# Patient Record
Sex: FEMALE | Race: WHITE | NOT HISPANIC OR LATINO | Employment: FULL TIME | URBAN - METROPOLITAN AREA
[De-identification: names, ages, dates, MRNs, and addresses within clinical notes are randomized per-mention and may not be internally consistent; named-entity substitution may affect disease eponyms.]

---

## 2017-01-11 ENCOUNTER — ANESTHESIA - HEALTHEAST (OUTPATIENT)
Dept: SURGERY | Facility: CLINIC | Age: 47
End: 2017-01-11

## 2017-01-11 ENCOUNTER — OFFICE VISIT - HEALTHEAST (OUTPATIENT)
Dept: FAMILY MEDICINE | Facility: CLINIC | Age: 47
End: 2017-01-11

## 2017-01-11 ENCOUNTER — COMMUNICATION - HEALTHEAST (OUTPATIENT)
Dept: FAMILY MEDICINE | Facility: CLINIC | Age: 47
End: 2017-01-11

## 2017-01-11 ENCOUNTER — HOSPITAL ENCOUNTER (OUTPATIENT)
Dept: CT IMAGING | Facility: CLINIC | Age: 47
Discharge: HOME | End: 2017-01-11
Attending: FAMILY MEDICINE

## 2017-01-11 ENCOUNTER — SURGERY - HEALTHEAST (OUTPATIENT)
Dept: SURGERY | Facility: CLINIC | Age: 47
End: 2017-01-11

## 2017-01-11 DIAGNOSIS — R10.9 ABDOMINAL PAIN: ICD-10-CM

## 2017-01-11 ASSESSMENT — MIFFLIN-ST. JEOR: SCORE: 1369.24

## 2017-01-12 LAB
GLIADIN IGA SER-ACNC: 1.6 U/ML
GLIADIN IGG SER-ACNC: 1.2 U/ML
IGA SERPL-MCNC: 226 MG/DL (ref 65–400)
TTG IGA SER-ACNC: 0.4 U/ML
TTG IGG SER-ACNC: 0.7 U/ML

## 2017-01-12 ASSESSMENT — MIFFLIN-ST. JEOR: SCORE: 1352.51

## 2017-01-24 ENCOUNTER — OFFICE VISIT - HEALTHEAST (OUTPATIENT)
Dept: SURGERY | Facility: CLINIC | Age: 47
End: 2017-01-24

## 2017-01-24 DIAGNOSIS — Z98.890 POST-OPERATIVE STATE: ICD-10-CM

## 2017-01-30 ENCOUNTER — COMMUNICATION - HEALTHEAST (OUTPATIENT)
Dept: FAMILY MEDICINE | Facility: CLINIC | Age: 47
End: 2017-01-30

## 2017-03-28 ENCOUNTER — OFFICE VISIT - HEALTHEAST (OUTPATIENT)
Dept: FAMILY MEDICINE | Facility: CLINIC | Age: 47
End: 2017-03-28

## 2017-03-28 DIAGNOSIS — Z91.09 ENVIRONMENTAL ALLERGIES: ICD-10-CM

## 2017-03-28 DIAGNOSIS — I10 ESSENTIAL HYPERTENSION: ICD-10-CM

## 2017-03-28 DIAGNOSIS — J45.909 ASTHMA: ICD-10-CM

## 2017-03-28 DIAGNOSIS — M77.10: ICD-10-CM

## 2017-04-20 ENCOUNTER — COMMUNICATION - HEALTHEAST (OUTPATIENT)
Dept: FAMILY MEDICINE | Facility: CLINIC | Age: 47
End: 2017-04-20

## 2017-04-20 DIAGNOSIS — I10 UNSPECIFIED ESSENTIAL HYPERTENSION: ICD-10-CM

## 2017-08-17 ENCOUNTER — COMMUNICATION - HEALTHEAST (OUTPATIENT)
Dept: FAMILY MEDICINE | Facility: CLINIC | Age: 47
End: 2017-08-17

## 2017-08-17 DIAGNOSIS — J45.20 ASTHMA IN ADULT, MILD INTERMITTENT, UNCOMPLICATED: ICD-10-CM

## 2017-08-31 ENCOUNTER — COMMUNICATION - HEALTHEAST (OUTPATIENT)
Dept: FAMILY MEDICINE | Facility: CLINIC | Age: 47
End: 2017-08-31

## 2017-08-31 DIAGNOSIS — J45.909 ASTHMA: ICD-10-CM

## 2017-11-09 ENCOUNTER — AMBULATORY - HEALTHEAST (OUTPATIENT)
Dept: NURSING | Facility: CLINIC | Age: 47
End: 2017-11-09

## 2017-11-09 DIAGNOSIS — Z23 NEED FOR INFLUENZA VACCINATION: ICD-10-CM

## 2017-12-11 ENCOUNTER — COMMUNICATION - HEALTHEAST (OUTPATIENT)
Dept: FAMILY MEDICINE | Facility: CLINIC | Age: 47
End: 2017-12-11

## 2017-12-11 DIAGNOSIS — J45.20 ASTHMA IN ADULT, MILD INTERMITTENT, UNCOMPLICATED: ICD-10-CM

## 2017-12-27 ENCOUNTER — COMMUNICATION - HEALTHEAST (OUTPATIENT)
Dept: FAMILY MEDICINE | Facility: CLINIC | Age: 47
End: 2017-12-27

## 2017-12-27 DIAGNOSIS — J45.20 MILD INTERMITTENT ASTHMA WITHOUT COMPLICATION: ICD-10-CM

## 2018-01-21 ENCOUNTER — COMMUNICATION - HEALTHEAST (OUTPATIENT)
Dept: FAMILY MEDICINE | Facility: CLINIC | Age: 48
End: 2018-01-21

## 2018-01-21 DIAGNOSIS — J45.20 MILD INTERMITTENT ASTHMA WITHOUT COMPLICATION: ICD-10-CM

## 2018-04-26 ENCOUNTER — COMMUNICATION - HEALTHEAST (OUTPATIENT)
Dept: FAMILY MEDICINE | Facility: CLINIC | Age: 48
End: 2018-04-26

## 2018-04-26 DIAGNOSIS — I10 ESSENTIAL HYPERTENSION: ICD-10-CM

## 2018-05-15 ENCOUNTER — AMBULATORY - HEALTHEAST (OUTPATIENT)
Dept: FAMILY MEDICINE | Facility: CLINIC | Age: 48
End: 2018-05-15

## 2018-05-15 DIAGNOSIS — Z12.31 VISIT FOR SCREENING MAMMOGRAM: ICD-10-CM

## 2018-05-15 DIAGNOSIS — L98.9 SKIN LESION: ICD-10-CM

## 2018-05-15 DIAGNOSIS — Z12.11 COLON CANCER SCREENING: ICD-10-CM

## 2018-05-15 DIAGNOSIS — I10 HTN (HYPERTENSION): ICD-10-CM

## 2018-05-15 DIAGNOSIS — J45.909 ASTHMA: ICD-10-CM

## 2018-05-24 ENCOUNTER — COMMUNICATION - HEALTHEAST (OUTPATIENT)
Dept: FAMILY MEDICINE | Facility: CLINIC | Age: 48
End: 2018-05-24

## 2018-05-24 DIAGNOSIS — J45.20 ASTHMA IN ADULT, MILD INTERMITTENT, UNCOMPLICATED: ICD-10-CM

## 2018-06-21 ENCOUNTER — COMMUNICATION - HEALTHEAST (OUTPATIENT)
Dept: ADMINISTRATIVE | Facility: CLINIC | Age: 48
End: 2018-06-21

## 2018-06-25 ENCOUNTER — COMMUNICATION - HEALTHEAST (OUTPATIENT)
Dept: FAMILY MEDICINE | Facility: CLINIC | Age: 48
End: 2018-06-25

## 2018-06-25 DIAGNOSIS — J45.31 MILD PERSISTENT ASTHMA WITH EXACERBATION: ICD-10-CM

## 2018-07-23 ENCOUNTER — COMMUNICATION - HEALTHEAST (OUTPATIENT)
Dept: FAMILY MEDICINE | Facility: CLINIC | Age: 48
End: 2018-07-23

## 2018-07-23 DIAGNOSIS — I10 ESSENTIAL HYPERTENSION: ICD-10-CM

## 2018-10-09 ENCOUNTER — RECORDS - HEALTHEAST (OUTPATIENT)
Dept: ADMINISTRATIVE | Facility: OTHER | Age: 48
End: 2018-10-09

## 2018-12-06 ENCOUNTER — RECORDS - HEALTHEAST (OUTPATIENT)
Dept: ADMINISTRATIVE | Facility: OTHER | Age: 48
End: 2018-12-06

## 2018-12-07 ENCOUNTER — RECORDS - HEALTHEAST (OUTPATIENT)
Dept: ADMINISTRATIVE | Facility: OTHER | Age: 48
End: 2018-12-07

## 2018-12-18 ENCOUNTER — HOSPITAL ENCOUNTER (OUTPATIENT)
Dept: MAMMOGRAPHY | Facility: CLINIC | Age: 48
Discharge: HOME | End: 2018-12-18
Attending: FAMILY MEDICINE

## 2018-12-18 DIAGNOSIS — Z12.31 VISIT FOR SCREENING MAMMOGRAM: ICD-10-CM

## 2019-03-10 ENCOUNTER — COMMUNICATION - HEALTHEAST (OUTPATIENT)
Dept: FAMILY MEDICINE | Facility: CLINIC | Age: 49
End: 2019-03-10

## 2019-03-10 DIAGNOSIS — J45.31 MILD PERSISTENT ASTHMA WITH EXACERBATION: ICD-10-CM

## 2019-03-15 ENCOUNTER — COMMUNICATION - HEALTHEAST (OUTPATIENT)
Dept: FAMILY MEDICINE | Facility: CLINIC | Age: 49
End: 2019-03-15

## 2019-04-01 ENCOUNTER — COMMUNICATION - HEALTHEAST (OUTPATIENT)
Dept: FAMILY MEDICINE | Facility: CLINIC | Age: 49
End: 2019-04-01

## 2019-05-01 ENCOUNTER — COMMUNICATION - HEALTHEAST (OUTPATIENT)
Dept: SCHEDULING | Facility: CLINIC | Age: 49
End: 2019-05-01

## 2019-05-01 DIAGNOSIS — B00.9 HSV (HERPES SIMPLEX VIRUS) INFECTION: ICD-10-CM

## 2019-07-18 ENCOUNTER — COMMUNICATION - HEALTHEAST (OUTPATIENT)
Dept: FAMILY MEDICINE | Facility: CLINIC | Age: 49
End: 2019-07-18

## 2019-07-18 DIAGNOSIS — J45.909 ASTHMA: ICD-10-CM

## 2019-07-23 ENCOUNTER — COMMUNICATION - HEALTHEAST (OUTPATIENT)
Dept: FAMILY MEDICINE | Facility: CLINIC | Age: 49
End: 2019-07-23

## 2019-07-23 DIAGNOSIS — I10 ESSENTIAL HYPERTENSION: ICD-10-CM

## 2019-08-16 ENCOUNTER — COMMUNICATION - HEALTHEAST (OUTPATIENT)
Dept: FAMILY MEDICINE | Facility: CLINIC | Age: 49
End: 2019-08-16

## 2019-08-16 DIAGNOSIS — J45.909 ASTHMA: ICD-10-CM

## 2019-08-28 ENCOUNTER — COMMUNICATION - HEALTHEAST (OUTPATIENT)
Dept: FAMILY MEDICINE | Facility: CLINIC | Age: 49
End: 2019-08-28

## 2019-08-28 ENCOUNTER — COMMUNICATION - HEALTHEAST (OUTPATIENT)
Dept: SCHEDULING | Facility: CLINIC | Age: 49
End: 2019-08-28

## 2019-08-28 DIAGNOSIS — B00.9 HSV (HERPES SIMPLEX VIRUS) INFECTION: ICD-10-CM

## 2019-09-13 ENCOUNTER — COMMUNICATION - HEALTHEAST (OUTPATIENT)
Dept: FAMILY MEDICINE | Facility: CLINIC | Age: 49
End: 2019-09-13

## 2019-09-13 DIAGNOSIS — B00.9 HSV (HERPES SIMPLEX VIRUS) INFECTION: ICD-10-CM

## 2019-10-18 ENCOUNTER — COMMUNICATION - HEALTHEAST (OUTPATIENT)
Dept: ADMINISTRATIVE | Facility: CLINIC | Age: 49
End: 2019-10-18

## 2019-10-20 ENCOUNTER — COMMUNICATION - HEALTHEAST (OUTPATIENT)
Dept: FAMILY MEDICINE | Facility: CLINIC | Age: 49
End: 2019-10-20

## 2019-10-20 DIAGNOSIS — I10 ESSENTIAL HYPERTENSION: ICD-10-CM

## 2019-10-20 DIAGNOSIS — Z12.11 SPECIAL SCREENING FOR MALIGNANT NEOPLASMS, COLON: ICD-10-CM

## 2019-10-24 ENCOUNTER — AMBULATORY - HEALTHEAST (OUTPATIENT)
Dept: FAMILY MEDICINE | Facility: CLINIC | Age: 49
End: 2019-10-24

## 2019-10-24 DIAGNOSIS — Z12.11 SPECIAL SCREENING FOR MALIGNANT NEOPLASMS, COLON: ICD-10-CM

## 2019-11-20 ENCOUNTER — COMMUNICATION - HEALTHEAST (OUTPATIENT)
Dept: FAMILY MEDICINE | Facility: CLINIC | Age: 49
End: 2019-11-20

## 2019-11-20 DIAGNOSIS — I10 ESSENTIAL HYPERTENSION: ICD-10-CM

## 2019-12-20 ENCOUNTER — COMMUNICATION - HEALTHEAST (OUTPATIENT)
Dept: FAMILY MEDICINE | Facility: CLINIC | Age: 49
End: 2019-12-20

## 2019-12-20 DIAGNOSIS — I10 ESSENTIAL HYPERTENSION: ICD-10-CM

## 2020-01-09 ENCOUNTER — RECORDS - HEALTHEAST (OUTPATIENT)
Dept: ADMINISTRATIVE | Facility: OTHER | Age: 50
End: 2020-01-09

## 2020-01-09 ENCOUNTER — OFFICE VISIT - HEALTHEAST (OUTPATIENT)
Dept: FAMILY MEDICINE | Facility: CLINIC | Age: 50
End: 2020-01-09

## 2020-01-09 DIAGNOSIS — Z00.00 ROUTINE GENERAL MEDICAL EXAMINATION AT A HEALTH CARE FACILITY: ICD-10-CM

## 2020-01-09 DIAGNOSIS — B00.9 HERPES SIMPLEX VIRUS (HSV) INFECTION: ICD-10-CM

## 2020-01-09 DIAGNOSIS — I10 ESSENTIAL HYPERTENSION: ICD-10-CM

## 2020-01-09 DIAGNOSIS — J45.30 MILD PERSISTENT ASTHMA WITHOUT COMPLICATION: ICD-10-CM

## 2020-01-09 DIAGNOSIS — J45.20 MILD INTERMITTENT ASTHMA WITHOUT COMPLICATION: ICD-10-CM

## 2020-01-09 DIAGNOSIS — M25.50 ARTHRALGIA, UNSPECIFIED JOINT: ICD-10-CM

## 2020-01-09 DIAGNOSIS — Z12.31 VISIT FOR SCREENING MAMMOGRAM: ICD-10-CM

## 2020-01-09 DIAGNOSIS — R21 RASH: ICD-10-CM

## 2020-01-09 DIAGNOSIS — M79.89 LEG SWELLING: ICD-10-CM

## 2020-01-09 DIAGNOSIS — L98.9 SKIN LESIONS: ICD-10-CM

## 2020-01-09 DIAGNOSIS — J45.909 ASTHMA: ICD-10-CM

## 2020-01-09 LAB — PAP SMEAR - HIM PATIENT REPORTED: ABNORMAL

## 2020-01-09 RX ORDER — TRIAMCINOLONE ACETONIDE 5 MG/G
OINTMENT TOPICAL
Qty: 30 G | Refills: 0 | Status: SHIPPED | OUTPATIENT
Start: 2020-01-09 | End: 2022-05-02

## 2020-01-09 ASSESSMENT — MIFFLIN-ST. JEOR: SCORE: 1380.85

## 2020-01-27 ENCOUNTER — HOSPITAL ENCOUNTER (OUTPATIENT)
Dept: MAMMOGRAPHY | Facility: CLINIC | Age: 50
Discharge: HOME | End: 2020-01-27
Attending: FAMILY MEDICINE

## 2020-01-27 DIAGNOSIS — Z12.31 VISIT FOR SCREENING MAMMOGRAM: ICD-10-CM

## 2020-02-06 ENCOUNTER — RECORDS - HEALTHEAST (OUTPATIENT)
Dept: ADMINISTRATIVE | Facility: OTHER | Age: 50
End: 2020-02-06

## 2020-02-12 ENCOUNTER — RECORDS - HEALTHEAST (OUTPATIENT)
Dept: ADMINISTRATIVE | Facility: OTHER | Age: 50
End: 2020-02-12

## 2020-02-18 ENCOUNTER — RECORDS - HEALTHEAST (OUTPATIENT)
Dept: HEALTH INFORMATION MANAGEMENT | Facility: CLINIC | Age: 50
End: 2020-02-18

## 2020-03-04 ENCOUNTER — OFFICE VISIT - HEALTHEAST (OUTPATIENT)
Dept: FAMILY MEDICINE | Facility: CLINIC | Age: 50
End: 2020-03-04

## 2020-03-04 DIAGNOSIS — I10 ESSENTIAL HYPERTENSION: ICD-10-CM

## 2020-03-04 DIAGNOSIS — S81.812A LACERATION OF LEFT LOWER LEG, INITIAL ENCOUNTER: ICD-10-CM

## 2020-03-17 ENCOUNTER — COMMUNICATION - HEALTHEAST (OUTPATIENT)
Dept: FAMILY MEDICINE | Facility: CLINIC | Age: 50
End: 2020-03-17

## 2020-07-24 ENCOUNTER — COMMUNICATION - HEALTHEAST (OUTPATIENT)
Dept: FAMILY MEDICINE | Facility: CLINIC | Age: 50
End: 2020-07-24

## 2020-07-24 DIAGNOSIS — J45.20 MILD INTERMITTENT ASTHMA WITHOUT COMPLICATION: ICD-10-CM

## 2021-01-15 ENCOUNTER — COMMUNICATION - HEALTHEAST (OUTPATIENT)
Dept: FAMILY MEDICINE | Facility: CLINIC | Age: 51
End: 2021-01-15

## 2021-01-15 DIAGNOSIS — J45.20 MILD INTERMITTENT ASTHMA WITHOUT COMPLICATION: ICD-10-CM

## 2021-01-15 RX ORDER — ALBUTEROL SULFATE 90 UG/1
AEROSOL, METERED RESPIRATORY (INHALATION)
Qty: 18 G | Refills: 12 | Status: SHIPPED | OUTPATIENT
Start: 2021-01-15 | End: 2022-05-02

## 2021-01-18 ENCOUNTER — OFFICE VISIT - HEALTHEAST (OUTPATIENT)
Dept: FAMILY MEDICINE | Facility: CLINIC | Age: 51
End: 2021-01-18

## 2021-01-18 DIAGNOSIS — J45.909 ASTHMA: ICD-10-CM

## 2021-01-18 DIAGNOSIS — I10 ESSENTIAL HYPERTENSION: ICD-10-CM

## 2021-01-22 ENCOUNTER — COMMUNICATION - HEALTHEAST (OUTPATIENT)
Dept: FAMILY MEDICINE | Facility: CLINIC | Age: 51
End: 2021-01-22

## 2021-03-09 ENCOUNTER — COMMUNICATION - HEALTHEAST (OUTPATIENT)
Dept: ADMINISTRATIVE | Facility: CLINIC | Age: 51
End: 2021-03-09

## 2021-03-09 DIAGNOSIS — J45.909 ASTHMA: ICD-10-CM

## 2021-03-10 RX ORDER — DEXAMETHASONE 4 MG/1
2 TABLET ORAL DAILY
Qty: 3 INHALER | Refills: 4 | Status: SHIPPED | OUTPATIENT
Start: 2021-03-10 | End: 2022-03-11

## 2021-03-29 ENCOUNTER — COMMUNICATION - HEALTHEAST (OUTPATIENT)
Dept: FAMILY MEDICINE | Facility: CLINIC | Age: 51
End: 2021-03-29

## 2021-03-29 DIAGNOSIS — B00.9 HERPES SIMPLEX VIRUS (HSV) INFECTION: ICD-10-CM

## 2021-03-29 RX ORDER — VALACYCLOVIR HYDROCHLORIDE 1 G/1
2000 TABLET, FILM COATED ORAL 2 TIMES DAILY
Qty: 4 TABLET | Refills: 12 | Status: SHIPPED | OUTPATIENT
Start: 2021-03-29 | End: 2022-05-02

## 2021-04-12 ENCOUNTER — COMMUNICATION - HEALTHEAST (OUTPATIENT)
Dept: ADMINISTRATIVE | Facility: CLINIC | Age: 51
End: 2021-04-12

## 2021-04-12 DIAGNOSIS — I10 ESSENTIAL HYPERTENSION: ICD-10-CM

## 2021-04-12 RX ORDER — CHLORTHALIDONE 25 MG/1
25 TABLET ORAL DAILY
Qty: 90 TABLET | Refills: 3 | Status: SHIPPED | OUTPATIENT
Start: 2021-04-12 | End: 2022-04-18

## 2021-04-14 ENCOUNTER — AMBULATORY - HEALTHEAST (OUTPATIENT)
Dept: NURSING | Facility: CLINIC | Age: 51
End: 2021-04-14

## 2021-05-05 ENCOUNTER — AMBULATORY - HEALTHEAST (OUTPATIENT)
Dept: NURSING | Facility: CLINIC | Age: 51
End: 2021-05-05

## 2021-05-27 NOTE — TELEPHONE ENCOUNTER
Called pt and let her know Dr. Bhakta would like to see her for this. She will call back at a later date to schedule.  CANDIE Flannery

## 2021-05-28 ENCOUNTER — RECORDS - HEALTHEAST (OUTPATIENT)
Dept: ADMINISTRATIVE | Facility: CLINIC | Age: 51
End: 2021-05-28

## 2021-05-28 ASSESSMENT — ASTHMA QUESTIONNAIRES
ACT_TOTALSCORE: 23
ACT_TOTALSCORE: 22

## 2021-05-28 NOTE — TELEPHONE ENCOUNTER
Patient needs her acyclovir  RX . Cannot see MD, she is going out of town, and will come in later.    She states she woke up today with a cold sore on her face.    Please advise.    Maggi Galicia RN  [facility] Triage/refill nurse

## 2021-05-30 VITALS — WEIGHT: 167.19 LBS | BODY MASS INDEX: 27.82 KG/M2

## 2021-05-30 VITALS — WEIGHT: 160 LBS | BODY MASS INDEX: 25.71 KG/M2 | HEIGHT: 66 IN

## 2021-05-30 VITALS — WEIGHT: 167.19 LBS | BODY MASS INDEX: 27.86 KG/M2 | HEIGHT: 65 IN

## 2021-05-30 VITALS — BODY MASS INDEX: 27.52 KG/M2 | WEIGHT: 170.5 LBS

## 2021-05-30 NOTE — TELEPHONE ENCOUNTER
RN cannot approve Refill Request    RN can NOT refill this medication med is not covered by policy/route to provider     . Last office visit: 3/28/2017 Sindhu Calle MD Last Physical: Visit date not found Last MTM visit: Visit date not found Last visit same specialty: 3/28/2017 Sindhu Calle MD.  Next visit within 3 mo: Visit date not found  Next physical within 3 mo: Visit date not found      Kailee Ruiz, [facility] Triage/Med Refill 7/18/2019    Requested Prescriptions   Pending Prescriptions Disp Refills     FLOVENT  mcg/actuation inhaler [Pharmacy Med Name: FLOVENT  MCG ORAL INH 120INH]  0     Sig: INHALE 2 PUFFS BY MOUTH TWICE DAILY       There is no refill protocol information for this order

## 2021-05-30 NOTE — TELEPHONE ENCOUNTER
RN cannot approve Refill Request    RN can NOT refill this medication PCP messaged that patient is overdue for Office Visit, and BP check. Last office visit: 3/28/2017 Sindhu Calle MD Last Physical: Visit date not found Last MTM visit: Visit date not found Last visit same specialty: 3/28/2017 Sindhu Calle MD.  Next visit within 3 mo: Visit date not found  Next physical within 3 mo: Visit date not found      Jesús Ibrahim, [facility] Triage/Med Refill 7/24/2019    Requested Prescriptions   Pending Prescriptions Disp Refills     amLODIPine (NORVASC) 10 MG tablet [Pharmacy Med Name: AMLODIPINE BESYLATE 10MG TABLETS] 90 tablet 0     Sig: TAKE 1 TABLET BY MOUTH DAILY       Calcium-Channel Blockers Protocol Failed - 7/23/2019  4:55 PM        Failed - PCP or prescribing provider visit in past 12 months or next 3 months     Last office visit with prescriber/PCP: 3/28/2017 Sindhu Calle MD OR same dept: Visit date not found OR same specialty: 3/28/2017 Sindhu Calle MD  Last physical: Visit date not found Last MTM visit: Visit date not found   Next visit within 3 mo: Visit date not found  Next physical within 3 mo: Visit date not found  Prescriber OR PCP: Sindhu Bah MD  Last diagnosis associated with med order: 1. Essential hypertension  - amLODIPine (NORVASC) 10 MG tablet [Pharmacy Med Name: AMLODIPINE BESYLATE 10MG TABLETS]; TAKE 1 TABLET BY MOUTH DAILY  Dispense: 90 tablet; Refill: 0    If protocol passes may refill for 12 months if within 3 months of last provider visit (or a total of 15 months).             Failed - Blood pressure filed in past 12 months     BP Readings from Last 1 Encounters:   05/15/18 114/68

## 2021-05-31 NOTE — TELEPHONE ENCOUNTER
RN cannot approve Refill Request    RN can NOT refill this medication med is not covered by policy/route to provider     . Last office visit: Visit date not found Last Physical: Visit date not found Last MTM visit: Visit date not found Last visit same specialty: 3/28/2017 Livia Bah, Sindhu Burnette MD.  Next visit within 3 mo: Visit date not found  Next physical within 3 mo: Visit date not found      Kailee Ruiz, [facility] Triage/Med Refill 8/16/2019    Requested Prescriptions   Pending Prescriptions Disp Refills     FLOVENT  mcg/actuation inhaler [Pharmacy Med Name: FLOVENT  MCG ORAL INH 120INH]  0     Sig: INHALE 2 PUFFS BY MOUTH TWICE DAILY       There is no refill protocol information for this order

## 2021-05-31 NOTE — TELEPHONE ENCOUNTER
RN cannot approve Refill Request    RN can NOT refill this medication PCP messaged that patient is overdue for Labs and Office Visit. Last office visit: 3/28/2017 Sindhu Calle MD Last Physical: Visit date not found Last MTM visit: Visit date not found Last visit same specialty: 3/28/2017 Sindhu Calle MD.  Next visit within 3 mo: Visit date not found  Next physical within 3 mo: Visit date not found      Mer GUERRA Abelino, [facility] Triage/Med Refill 8/28/2019    Requested Prescriptions   Pending Prescriptions Disp Refills     acyclovir (ZOVIRAX) 400 MG tablet 45 tablet 0     Sig: One tablet every other day       Antivirals Refill Protocol Failed - 8/28/2019  9:51 AM        Failed - Renal function done in last year     Creatinine   Date Value Ref Range Status   01/11/2017 0.63 0.60 - 1.10 mg/dL Final             Failed - Visit with PCP or prescribing provider visit in past 12 months or next 3 months     Last office visit with prescriber/PCP: 3/28/2017 Sindhu Calle MD OR same dept: Visit date not found OR same specialty: 3/28/2017 Sindhu Calle MD  Last physical: Visit date not found Last MTM visit: Visit date not found   Next visit within 3 mo: Visit date not found  Next physical within 3 mo: Visit date not found  Prescriber OR PCP: Sindhu Bah MD  Last diagnosis associated with med order: 1. HSV (herpes simplex virus) infection  - acyclovir (ZOVIRAX) 400 MG tablet; One tablet every other day  Dispense: 45 tablet; Refill: 0    If protocol passes may refill for 12 months if within 3 months of last provider visit (or a total of 15 months).             Passed - Patient does not have active pregnancy episode        Passed - Patient has not had positive pregnancy test in last 280 days     Beta hCG Qualitative   Date Value Ref Range Status   01/11/2017 Negative Negative Final

## 2021-05-31 NOTE — TELEPHONE ENCOUNTER
Lmtcb: please relay Dr. Bhakta's note about refused medication and help patient schedule a physical/med check appointment with Dr. Bhakta.    Jacqueline KHAN CMA ([facility])

## 2021-05-31 NOTE — TELEPHONE ENCOUNTER
Refill Request  Did you contact pharmacy: Yes  Medication name:   Requested Prescriptions     Pending Prescriptions Disp Refills     acyclovir (ZOVIRAX) 400 MG tablet 45 tablet 0     Sig: One tablet every other day     Who prescribed the medication: Sindhu Calle MD  Pharmacy Name and Location: [facility] #[id number]  Is patient out of medication: Yes. Patient states her pharmacy was suppose to request this on 8/16/2019, but did not.  Patient notified refills processed in 72 hours:  yes  Okay to leave a detailed message: yes

## 2021-05-31 NOTE — TELEPHONE ENCOUNTER
"Triage call:     Patient calling regarding her acyclovir refill request. Again reviewed provider message as patient has not been seen. No appointment since 2017. Patient again declines an appointment and states that she has been on this medication for 20 years and she is upset that she can't get this medication. Patient states that she just wants to let PCP know that she is upset and to tell her \"thanks\" patient then disconnected call.     Mer Roca RN [facility] Triage/Med Refill 8/28/2019 1:59 PM    Reason for Disposition    [1] Other NON-URGENT information for PCP AND [2] does not require PCP response    Protocols used: PCP CALL - NO TRIAGE-A-      "

## 2021-06-01 VITALS — WEIGHT: 166.19 LBS | BODY MASS INDEX: 26.82 KG/M2

## 2021-06-01 NOTE — TELEPHONE ENCOUNTER
RN cannot approve Refill Request    RN can NOT refill this medication PCP messaged that patient is overdue for Labs and Office Visit. Last office visit: 3/28/2017 Sindhu Calle MD Last Physical: Visit date not found Last MTM visit: Visit date not found Last visit same specialty: 3/28/2017 Sindhu Calle MD.  Next visit within 3 mo: Visit date not found  Next physical within 3 mo: Visit date not found      Marii Saldaña, [facility] Triage/Med Refill 9/13/2019    Requested Prescriptions   Pending Prescriptions Disp Refills     acyclovir (ZOVIRAX) 400 MG tablet 45 tablet 0     Sig: One tablet every other day       Antivirals Refill Protocol Failed - 9/13/2019 12:28 AM        Failed - Renal function done in last year     Creatinine   Date Value Ref Range Status   01/11/2017 0.63 0.60 - 1.10 mg/dL Final             Failed - Visit with PCP or prescribing provider visit in past 12 months or next 3 months     Last office visit with prescriber/PCP: 3/28/2017 Sindhu Calle MD OR same dept: Visit date not found OR same specialty: 3/28/2017 Sindhu Calle MD  Last physical: Visit date not found Last MTM visit: Visit date not found   Next visit within 3 mo: Visit date not found  Next physical within 3 mo: Visit date not found  Prescriber OR PCP: Sindhu Bah MD  Last diagnosis associated with med order: 1. HSV (herpes simplex virus) infection  - acyclovir (ZOVIRAX) 400 MG tablet; One tablet every other day  Dispense: 45 tablet; Refill: 0    If protocol passes may refill for 12 months if within 3 months of last provider visit (or a total of 15 months).             Passed - Patient does not have active pregnancy episode        Passed - Patient has not had positive pregnancy test in last 280 days     Beta hCG Qualitative   Date Value Ref Range Status   01/11/2017 Negative Negative Final

## 2021-06-02 NOTE — TELEPHONE ENCOUNTER
RN cannot approve Refill Request    RN can NOT refill this medication PCP messaged that patient is overdue for Office Visit and Physical  and Protocol failed and NO refill given. Last office visit: Visit date not found Last Physical: Visit date not found Last MTM visit: Visit date not found Last visit same specialty: 3/28/2017 Sindhu Calle MD.  Next visit within 3 mo: Visit date not found  Next physical within 3 mo: Visit date not found  Last OV 3/28/2017  Last refill 7/25/2019 for 90/0  Kailey Kaur, [facility] Triage/Med Refill 10/20/2019    Requested Prescriptions   Pending Prescriptions Disp Refills     amLODIPine (NORVASC) 10 MG tablet [Pharmacy Med Name: AMLODIPINE BESYLATE 10MG TABLETS] 90 tablet 0     Sig: TAKE 1 TABLET BY MOUTH DAILY       Calcium-Channel Blockers Protocol Failed - 10/20/2019  3:12 AM        Failed - PCP or prescribing provider visit in past 12 months or next 3 months     Last office visit with prescriber/PCP: Visit date not found OR same dept: Visit date not found OR same specialty: 3/28/2017 Sindhu Calle MD  Last physical: Visit date not found Last MTM visit: Visit date not found   Next visit within 3 mo: Visit date not found  Next physical within 3 mo: Visit date not found  Prescriber OR PCP: Giancarlo Alvarado MD  Last diagnosis associated with med order: 1. Essential hypertension  - amLODIPine (NORVASC) 10 MG tablet [Pharmacy Med Name: AMLODIPINE BESYLATE 10MG TABLETS]; TAKE 1 TABLET BY MOUTH DAILY  Dispense: 90 tablet; Refill: 0    If protocol passes may refill for 12 months if within 3 months of last provider visit (or a total of 15 months).             Failed - Blood pressure filed in past 12 months     BP Readings from Last 1 Encounters:   05/15/18 114/68

## 2021-06-02 NOTE — TELEPHONE ENCOUNTER
Dr. Bhakta    A screening colonoscopy was ordered for Martine on 5/15/18 but was never scheduled.    Is she a candidate for cologuard?    Lida

## 2021-06-03 NOTE — TELEPHONE ENCOUNTER
Refill Request  Did you contact pharmacy: Request received from patient's pharmacy via fax.   Medication name:   Requested Prescriptions     Pending Prescriptions Disp Refills     amLODIPine (NORVASC) 10 MG tablet 30 tablet 0     Sig: Take 1 tablet (10 mg total) by mouth daily.     Who prescribed the medication: Sindhu Calle MD   Pharmacy Name and Location: [facility] )  Is patient out of medication: Information not provided.   Patient notified refills processed in 72 hours:  no  Okay to leave a detailed message: no

## 2021-06-03 NOTE — TELEPHONE ENCOUNTER
----- Message from Sindhu Bah MD sent at 11/19/2019  7:48 AM CST -----  Please assist the patient with an appointment to see me about her blood pressure.  Thank you.

## 2021-06-04 VITALS
DIASTOLIC BLOOD PRESSURE: 80 MMHG | WEIGHT: 168 LBS | HEART RATE: 76 BPM | SYSTOLIC BLOOD PRESSURE: 124 MMHG | HEIGHT: 66 IN | BODY MASS INDEX: 27 KG/M2

## 2021-06-04 VITALS
SYSTOLIC BLOOD PRESSURE: 124 MMHG | WEIGHT: 167 LBS | BODY MASS INDEX: 27.37 KG/M2 | DIASTOLIC BLOOD PRESSURE: 62 MMHG | HEART RATE: 80 BPM

## 2021-06-04 NOTE — TELEPHONE ENCOUNTER
RN cannot approve Refill Request    RN can NOT refill this medication Protocol failed and NO refill given.       Kailee Ruiz, [facility] Triage/Med Refill 12/23/2019    Requested Prescriptions   Pending Prescriptions Disp Refills     amLODIPine (NORVASC) 10 MG tablet [Pharmacy Med Name: AMLODIPINE BESYLATE 10MG TABLETS] 90 tablet 3     Sig: TAKE 1 TABLET BY MOUTH DAILY       Calcium-Channel Blockers Protocol Failed - 12/20/2019  5:01 AM        Failed - PCP or prescribing provider visit in past 12 months or next 3 months     Last office visit with prescriber/PCP: 3/28/2017 Sindhu Calle MD OR same dept: Visit date not found OR same specialty: 3/28/2017 Sindhu Calle MD  Last physical: Visit date not found Last MTM visit: Visit date not found   Next visit within 3 mo: Visit date not found  Next physical within 3 mo: Visit date not found  Prescriber OR PCP: Sindhu Bah MD  Last diagnosis associated with med order: 1. Essential hypertension  - amLODIPine (NORVASC) 10 MG tablet [Pharmacy Med Name: AMLODIPINE BESYLATE 10MG TABLETS]; TAKE 1 TABLET BY MOUTH DAILY  Dispense: 30 tablet; Refill: 0    If protocol passes may refill for 12 months if within 3 months of last provider visit (or a total of 15 months).             Failed - Blood pressure filed in past 12 months     BP Readings from Last 1 Encounters:   05/15/18 114/68

## 2021-06-04 NOTE — TELEPHONE ENCOUNTER
FYI - Status Update  Who is Calling: Patient  Update: Patient does have an appointment set up to see Dr. Bhakta on 1/9/20.  She is out of amlodipine.  Please send in refill.   Okay to leave a detailed message?:  Yes.  Please call patient to update her on this request.

## 2021-06-06 NOTE — PROGRESS NOTES
ASSESSMENT/PLAN:  Laceration of left lower leg, initial encounter, back of ankle by the achilles tendon  Deep laceration that occurred over 24 hours ago.  The wound appeared dry and clean.  Tetanus is up to date.  Here in the clinic, the wound was dressed with sterile strip followed by antibiotic ointment, nonadherent gauze, and ACE wrap.  She was given a walking boot to prevent movement of the ankle to promote healing the of the wound  F/u in 2wks    Essential hypertension  Leg swelling is better since d/c of amlodipine  BP is controlled on chlorthalidone  Refills were provided  -     chlorthalidone (HYGROTEN) 25 MG tablet; Take 1 tablet (25 mg total) by mouth daily.    CHIEF COMPLAINT:  Chief Complaint   Patient presents with     Hypertension     CUT LEFT ANKLE     yesterday cut on oven drawer     Medication Refill     BP     HISTORY OF PRESENT ILLNESS:  Martine is a 49 y.o. female presenting to the clinic today for an injury to her left ankle. Yesterday she had the drawer under her oven out while working on her oven. She walked backwards and cut her posterior left ankle on the edge. The wound continue to bleed for about 24 hours until a co-worker wrapped the wound for her. Last night, she was having difficulty walking on her left foot. She also had pain while she was sleeping. She denies any fever or chills. Her tetanus vaccination is up to date.     REVIEW OF SYSTEMS:   Constitutional: Negative.   HENT: Negative.   Eyes: Negative.   Respiratory: Negative.   Cardiovascular: Negative.   Gastrointestinal: Negative.   Endocrine: Negative.   Genitourinary: Negative.   Musculoskeletal: Negative.   Skin: Negative.   Allergic/Immunologic: Negative.   Neurological: Negative.   Hematological: Negative.   Psychiatric/Behavioral: Negative.   All other systems are negative.    TOBACCO USE:  Social History     Tobacco Use   Smoking Status Never Smoker   Smokeless Tobacco Never Used     VITALS:  Vitals:    03/04/20 1538   BP:  124/62   Pulse: 80   Weight: 167 lb (75.8 kg)     Wt Readings from Last 3 Encounters:   03/04/20 167 lb (75.8 kg)   01/09/20 168 lb (76.2 kg)   05/15/18 166 lb 3 oz (75.4 kg)       PHYSICAL EXAM:  Constitutional: Patient is oriented to person, place, and time. Patient appears well-developed and well-nourished. No distress.   Head: Normocephalic and atraumatic.   Right Ear: External ear normal.   Left Ear: External ear normal.   Nose: Nose normal.   Neurological: Patient is alert and oriented to person, place, and time. Patient has normal reflexes. No cranial nerve deficit. Coordination normal.   Skin: Skin is warm and dry. No rash noted. Patient is not diaphoretic. No erythema. No pallor. She has a 1.5mm linear laceration that is about 3 mm deep at the achilles side of the left ankle. It is clean and dry. It is not actively bleeding. It is tender to touch.   achilles tendon is intact on squeezing of the calf      ADDITIONAL HISTORY SUMMARIZED (2): None.  DECISION TO OBTAIN EXTRA INFORMATION (1): None.   RADIOLOGY TESTS (1): None.  LABS (1): None.  MEDICINE TESTS (1): None.  INDEPENDENT REVIEW (2 each): None.     IInna, am scribing for and in the presence of, Dr. Bhakta.    IDr. Bhakta, personally performed the services described in this documentation, as scribed by Inna Gonzalez in my presence, and it is both accurate and complete.    MEDICATIONS:  Current Outpatient Medications   Medication Sig Dispense Refill     albuterol (VENTOLIN HFA) 90 mcg/actuation inhaler INHALE 2 PUFFS EVERY 6 HOURS AS NEEDED FOR WHEEZING 18 g 12     b complex vitamins tablet Take 1 tablet by mouth daily.       chlorthalidone (HYGROTEN) 25 MG tablet Take 1 tablet (25 mg total) by mouth daily. 90 tablet 3     docoshexanoic acid-eicosapent 500 mg (FISH OIL) 500-100 mg cap capsule Take 2,000 mg by mouth daily.       fluticasone propionate (FLOVENT HFA) 110 mcg/actuation inhaler Inhale 2 puffs 2 (two) times a day. 1 Inhaler 12      multivitamin therapeutic (THERAGRAN) tablet Take 1 tablet by mouth daily.       valACYclovir (VALTREX) 1000 MG tablet Take 2 tablets (2,000 mg total) by mouth 2 (two) times a day. 4 tablet 12     triamcinolone (KENALOG) 0.5 % ointment Apply to neck and chest three times daily as needed 30 g 0     No current facility-administered medications for this visit.        Total data points:0

## 2021-06-08 NOTE — PROGRESS NOTES
38-year-old female presents with lower abdominal pain specifically right lower quadrant abdominal pain of 2 days' duration.  We spoke about differential diagnoses.  For now I would like to evaluate CBC, CMP, lipase, celiac.  We'll obtain a CT abdomen to rule out appendicitis.  I will like her to stop NSAIDs.  I will also like her to stop alcohol.  Constipation is another differential diagnosis and we spoke about fluid hydration and crease and fiber.  I will communicate the results of the patient.  Further management will depend on results.  Patient verbalized understanding and agreed with the plan    ASSESSMENT/PLAN:  1. Abdominal pain  - HM2(CBC w/o Differential)  - Comprehensive Metabolic Panel  - Lipase  - CT Abdomen Pelvis Without Oral With Without IV Contrast; Future  - Celiac(Gluten)Antibody Panel      SUBJECTIVE:    Martine Biswas is a 46 y.o. female who comes in today for lower abdominal pain particularly right lower quadrant abdominal pain that started 5:00 yesterday morning.  This pain woke her up in the morning.  It was described to be constant, 10 out of 10 in intensity, associated with nausea yesterday.  She has not had any new exposure but did drink her usual amount of wine the night prior (2-3 glasses).  She felt the pain throughout the day yesterday and into the morning this morning.  This morning the pain is a little better, about a 3-10.  Her appetite is better.  The nausea is better.  She has not had any vomiting, fever, chills, rash, joint pain.  Denies dysuria, hematuria, urinary urgency.  She does have a history of urinary frequency but this is unchanged and not new.  Yesterday when her pain was intense she was having small frequent episodes of bowel movements about 5-6 times yesterday.  Today her bowel movement has resumed to its normal characteristic.  Normal bowel movement for her is daily.  Her last menstrual period was 1-2 weeks ago.  Patient has a history of GERD for which she does  when necessary Zantac.  She drinks wine a couple glasses a night.  The patient also has a history of gastritis from NSAIDs because of headaches.    Review of Systems (except those mentioned above)  Constitutional: Negative.   HENT: Negative.   Eyes: Negative.   Respiratory: Negative.   Cardiovascular: Negative.   Gastrointestinal: Negative.   Endocrine: Negative.   Genitourinary: Negative.   Musculoskeletal: Negative.   Skin: Negative.   Allergic/Immunologic: Negative.   Neurological: Negative.   Hematological: Negative.   Psychiatric/Behavioral: Negative.     Patient Active Problem List    Diagnosis Date Noted     Anxiety      Hyperlipidemia      Herpes Simplex Type I, nose      Essential Hypertension      Mild Persistent Asthma      Esophageal Reflux      Allergic Rhinitis      Allergies   Allergen Reactions     Amoxicillin Diarrhea     Current Outpatient Prescriptions   Medication Sig Dispense Refill     acyclovir (ZOVIRAX) 400 MG tablet TAKE 1 TABLET BY MOUTH TWICE DAILY 180 tablet 3     albuterol (VENTOLIN HFA) 90 mcg/actuation inhaler INHALE 1 TO 2 PUFFS BY MOUTH EVERY 4 TO 6 HOURS AS NEEDED AND AS DIRECTED 18 g 1     amLODIPine (NORVASC) 10 MG tablet TAKE 1 TABLET BY MOUTH DAILY 90 tablet 2     DOCOSAHEXANOIC ACID/EPA (FISH OIL ORAL) Take 2 capsules by mouth daily. 1000 mg       FLOVENT  mcg/actuation inhaler INHALE 2 PUFFS BY MOUTH TWICE DAILY 12 Inhaler 4     MULTIVIT &MINERALS/FERROUS FUM (MULTI VITAMIN ORAL) Take 1 tablet by mouth daily.       norethindrone-ethinyl estradiol (ORTHO-NOVUM 1-35 TAB,NORTREL 1-35 TAB) 1-35 mg-mcg per tablet Take 1 tablet by mouth daily.       VITAMIN B COMPLEX ORAL Take 1 tablet by mouth daily.       fluticasone (FLONASE) 50 mcg/actuation nasal spray SHAKE WELL AND USE 1 SPRAY IN EACH NOSTRIL DAILY 16 g 2     No current facility-administered medications for this visit.      No past medical history on file.  No past surgical history on file.  Social History     Social  History     Marital status:      Spouse name: N/A     Number of children: N/A     Years of education: N/A     Social History Main Topics     Smoking status: Never Smoker     Smokeless tobacco: None     Alcohol use None     Drug use: None     Sexual activity: Not Asked     Other Topics Concern     None     Social History Narrative     No family history on file.      OBJECTIVE:    Vitals:    01/11/17 1411   BP: 112/78   Patient Site: Left Arm   Patient Position: Sitting   Cuff Size: Adult Regular   Pulse: 76   Weight: 167 lb 3 oz (75.8 kg)     Body mass index is 27.82 kg/(m^2).    Physical Exam:  Constitutional: Patient is oriented to person, place, and time. Patient appears well-developed and well-nourished. No distress.   Head: Normocephalic and atraumatic.   Right Ear: External ear normal.   Left Ear: External ear normal.   Nose: Nose normal.   Mouth/Throat: Oropharynx is clear and moist. No oropharyngeal exudate.   Eyes: Conjunctivae and EOM are normal. Pupils are equal, round, and reactive to light. Right eye exhibits no discharge. Left eye exhibits no discharge. No scleral icterus.   Neck: Neck supple. No JVD present. No tracheal deviation present. No thyromegaly present.   Lymphadenopathy:  Patient has no cervical adenopathy.   Cardiovascular: Normal rate, regular rhythm, normal heart sounds and intact distal pulses. No murmur heard.   Pulmonary/Chest: Effort normal and breath sounds normal. No stridor. No respiratory distress. Patient has no wheezes, no rales, exhibits no tenderness.   Abdominal: Soft. Bowel sounds are normal. Patient exhibits no distension and no mass. There is tenderness to palpation of the right lower quadrant, suprapubic, left lower quadrant. There is no rebound and no guarding.   Neurological: Patient is alert and oriented to person, place, and time. Patient has normal reflexes. No cranial nerve deficit. Coordination normal.   Skin: Skin is warm and dry. No rash noted. Patient is  not diaphoretic. No erythema. No pallor.

## 2021-06-08 NOTE — ANESTHESIA POSTPROCEDURE EVALUATION
Patient: Martine Biswas  APPENDECTOMY, LAPAROSCOPIC  Anesthesia type: general    Patient location: PACU  Last vitals:   Vitals:    01/11/17 2240   BP: 134/68   Pulse: 86   Resp: 16   Temp:    SpO2: 100%     Post vital signs: stable  Level of consciousness: awake, alert and responds to simple questions  Post-anesthesia pain: pain controlled  Post-anesthesia nausea and vomiting: no  Pulmonary: unassisted, return to baseline  Cardiovascular: stable and blood pressure at baseline  Hydration: adequate  Anesthetic events: no    QCDR Measures:  ASA# 11 - Hina-op Cardiac Arrest: ASA11B - Patient did NOT experience unanticipated cardiac arrest  ASA# 12 - Hina-op Mortality Rate: ASA12B - Patient did NOT die  ASA# 13 - PACU Re-Intubation Rate: ASA13B - Patient did NOT require a new airway mgmt  ASA# 10 - Composite Anes Safety: ASA10A - No serious adverse event  ASA# 38 - New Corneal Injury: ASA38A - No new exposure keratitis or corneal abrasion in PACU    Additional Notes:

## 2021-06-08 NOTE — ANESTHESIA PREPROCEDURE EVALUATION
Anesthesia Evaluation      Patient summary reviewed   No history of anesthetic complications     Airway   Mallampati: I  Neck ROM: full   Pulmonary - normal exam    breath sounds clear to auscultation  (+) asthma                           Cardiovascular - negative ROS and normal exam  Exercise tolerance: > or = 4 METS  (+) hypertension well controlled, ,     Rhythm: regular  Rate: normal,         Neuro/Psych - negative ROS     Endo/Other - negative ROS      GI/Hepatic/Renal    (+) GERD well controlled,             Dental                         Anesthesia Plan  Planned anesthetic: general endotracheal    ASA 2 - emergent   Induction: intravenous   Anesthetic plan and risks discussed with: patient  Anesthesia plan special considerations: antiemetics,   Post-op plan: routine recovery

## 2021-06-08 NOTE — ANESTHESIA CARE TRANSFER NOTE
Last vitals:   Vitals:    01/11/17 2033   BP: 133/74   Pulse: 87   Resp:    Temp:    SpO2: 97%     Patient's level of consciousness is drowsy  Spontaneous respirations: yes  Maintains airway independently: yes  Dentition unchanged: yes  Oropharynx: oropharynx clear of all foreign objects    QCDR Measures:  ASA# 20 - Surgical Safety Checklist: ASA20A - Safety Checks Done  PQRS# 430 - Adult PONV Prevention: 4558F - Pt received => 2 anti-emetic agents (different classes) preop & intraop  ASA# 8 - Peds PONV Prevention: NA - Not pediatric patient, not GA or 2 or more risk factors NOT present  PQRS# 424 - Hina-op Temp Management: 4559F-8P - Body temp not recorded within timeframe  PQRS# 426 - PACU Transfer Protocol: - Transfer of care checklist used  ASA# 14 - Acute Post-op Pain: ASA14B - Patient did NOT experience pain >= 7 out of 10    I completed my SBAR handoff to the receiving nurse per policy and procedure.Pt breathing spontaneously, follows commands, suctioned extubated. Spontaneous respirations with SFM to PACU, VSS

## 2021-06-09 NOTE — PROGRESS NOTES
ASSESSMENT/PLAN:  1. Essential hypertension  Stable on amlodipine 10 mg    2. Asthma, mild intermittent  Stable on as needed albuterol    3. Environmental allergies  She has been off of tonic use of Claritin D.  Will try Flonase    4. Epicondylitis, lateral, left  - Ambulatory referral to Spine Care    5.  HSVI  Weaning down use of acyclovir to once every other day    Orders Placed This Encounter   Procedures     Ambulatory referral to Spine Care     Referral Priority:   Routine     Referral Type:   Consultation     Referral Reason:   Evaluation and Treatment     Number of Visits Requested:   1           CHIEF COMPLAINT:  Chief Complaint   Patient presents with     Elbow Pain     left elbow x 6 months, no injury related     Medication Refill     wants to maria dolores back to use Flonase, need refill       HISTORY OF PRESENT ILLNESS:  Martine is a 46 y.o. female presenting to the clinic today for elbow pain. This has been going on for 6 months. This is not injury related. She says that it does not hurt to touch. She has pain with lifting. The pain can radiate up her left arm. She has not been using her left arm much. She holds her phone in the air with her left arm, and thinks this could have triggered her elbow pain. She sometimes lifts weights, but did not do it that often prior to onset of pain. She is right hand dominant. She has noticed some numbness down her left forearm. The pain has gotten to the point to where it can effect her basic functions.     Asthma/Allergies: Her asthma has been stable. It is her allergy season, and she would like to try Flonase for this season. She has been using albuterol as needed for her asthma.       REVIEW OF SYSTEMS:   Blood pressure stable on amlodipine. She takes Zantac as needed for her reflux, and is working on her diet. She is only taking one Acyclovir per day. All other systems are negative.    PFSH:  No new history.     TOBACCO USE:  History   Smoking Status     Never Smoker    Smokeless Tobacco     Not on file       VITALS:  Vitals:    03/28/17 1309   BP: 112/80   Patient Site: Left Arm   Patient Position: Sitting   Cuff Size: Adult Regular   Pulse: 84   Weight: 170 lb 8 oz (77.3 kg)     Wt Readings from Last 3 Encounters:   03/28/17 170 lb 8 oz (77.3 kg)   01/12/17 160 lb (72.6 kg)   01/11/17 167 lb 3 oz (75.8 kg)       PHYSICAL EXAM:  Constitutional: Patient is oriented to person, place, and time. Patient appears well-developed and well-nourished. No distress.   Cardiovascular: Normal rate.  Pulmonary/Chest: Effort normal. No respiratory distress.   Neurological: Patient is alert and oriented to person, place, and time.  Musculoskeletal: Good flex/ex good pronation and suppination. Some tenderness to palpation to the lateral epicondyle.     ADDITIONAL HISTORY SUMMARIZED (2): None.  DECISION TO OBTAIN EXTRA INFORMATION (1): None.   RADIOLOGY TESTS (1): None.  LABS (1): None.  MEDICINE TESTS (1): None.  INDEPENDENT REVIEW (2 each): None.     The visit lasted a total of 15 minutes face to face with the patient. Over 50% of the time was spent counseling and educating the patient about elbow pain etiologies.    Sally POTTER, am scribing for and in the presence of, Dr. Bhakta.    Dr. Livia POTTER, personally performed the services described in this documentation, as scribed by Sally Boothe in my presence, and it is both accurate and complete.    MEDICATIONS:  Current Outpatient Prescriptions   Medication Sig Dispense Refill     acyclovir (ZOVIRAX) 400 MG tablet Take 400 mg by mouth 2 (two) times a day.       amLODIPine (NORVASC) 10 MG tablet Take 10 mg by mouth daily.       b complex vitamins tablet Take 1 tablet by mouth daily.       docoshexanoic acid-eicosapent 500 mg (FISH OIL) 500-100 mg cap capsule Take 2,000 mg by mouth daily.       fluticasone (FLOVENT HFA) 110 mcg/actuation inhaler Inhale 2 puffs 2 (two) times a day.       HYDROcodone-acetaminophen 5-325 mg per  tablet Take 1-2 tablets by mouth every 4 (four) hours as needed. 20 tablet 0     multivitamin therapeutic (THERAGRAN) tablet Take 1 tablet by mouth daily.       norethindrone-ethinyl estradiol (ORTHO-NOVUM 1-35 TAB,NORTREL 1-35 TAB) 1-35 mg-mcg per tablet Take 1 tablet by mouth daily.       albuterol (PROVENTIL HFA;VENTOLIN HFA) 90 mcg/actuation inhaler Inhale 2 puffs every 6 (six) hours as needed for wheezing.       No current facility-administered medications for this visit.        Total data points: 0

## 2021-06-13 ENCOUNTER — HEALTH MAINTENANCE LETTER (OUTPATIENT)
Age: 51
End: 2021-06-13

## 2021-06-14 NOTE — PROGRESS NOTES
Martine Biswas is a 50 y.o. female who is being evaluated via a billable video visit.      How would you like to obtain your AVS? MyChart.  If dropped from the video visit, the video invitation should be resent by: Text to cell phone: [phone]  Will anyone else be joining your video visit? No      Video Start Time: 11:40 AM  Assessment & Plan     Martine was seen today for medication refill.    Diagnoses and all orders for this visit:    Asthma  -     fluticasone propionate (FLOVENT HFA) 110 mcg/actuation inhaler; Inhale 2 puffs daily.  Refilled  F/u yearly    Essential hypertension  Stable on chlorthalidone  F/u yearly       Sindhu Bah MD  [facility]     Martine Biswas is 50 y.o. and presents to clinic today for the following health issues   HPI     In the past 4 weeks, how much of the time did your asthma keep you from getting as much done at work, school, or at home?: None of the time  During the past 4 weeks, how often have you had shortness of breath?: Not at all  During the past 4 weeks, how often did your asthma symptoms (wheezing, coughing, shortness of breath, chest tightness or pain) wake you up at night or earlier in the morning?: Not at all  During the past 4 weeks, how often have you used your rescue inhaler or nebulizer medication (such as albuterol)?: 1 or 2 times per day  How would you rate your asthma control during the past 4 weeks?: Completely controlled  ACT Total Score: 22  In the past 12 months, have you visited the emergency room due to your asthma?: No  In the past 12 months, have you been hospitalized due to your asthma?: No     BP is controlled with chlorthalidone  Objective       Vitals:  No vitals were obtained today due to virtual visit.    Physical Exam  Constitutional: Patient is oriented to person, place, and time. Patient appears well-developed and well-nourished. No distress.   Head: Normocephalic and  atraumatic.   Right Ear: External ear normal.   Left Ear: External ear normal.   Nose: Nose normal.   Eyes: Conjunctivae and EOM are normal. Right eye exhibits no discharge. Left eye exhibits no discharge. No scleral icterus.   Neurological: Patient is alert and oriented to person, place, and time. No cranial nerve deficit. Coordination normal.   Skin: No rash noted. Patient is not diaphoretic. No erythema. No pallor.    Video-Visit Details    Type of service:  Video Visit    Video End Time (time video stopped): 11:53 AM  Originating Location (pt. Location): Home    Distant Location (provider location):  [facility]     Platform used for Video Visit: Other: [employer]

## 2021-06-15 NOTE — TELEPHONE ENCOUNTER
I teed up the prescription for 90 day supply - they are telling her that a 90 day supply is same cost as monthly supply.

## 2021-06-15 NOTE — TELEPHONE ENCOUNTER
Please inquire with the pharmacy.  I'm clear as to how the prescription is to be written, according to this message.  Thank you.

## 2021-06-15 NOTE — TELEPHONE ENCOUNTER
Pt states that she filled the fluticasone propionate (FLOVENT HFA) 110 mcg/actuation inhaler but it was really expensive and the pharmacy told her it was the way the prescription was written.    They told the patient she could be getting a 3 month supply for the same price.   Pt had no other details as to how it was written wrong or how to correct the prescription.     Ok to leave detailed message.     Joi Crawford

## 2021-06-16 NOTE — TELEPHONE ENCOUNTER
Patient sent [employer] message for Refill request for her valacyclovir as she is having an outbreak.   Una Hernadez LPN

## 2021-06-18 NOTE — PROGRESS NOTES
ASSESSMENT/PLAN:  Skin lesions  A skin tag on the superior and middle aspect of her left clavicle  A cyst (likely sebaceous) medial to the skin tag  No management at this time  Monitor and f/u if with concerns    HTN (hypertension)   good control with amlodipine 10mg    Asthma, mild persistent in addition to exercise induced  ACT=21  Refilled flovent  -     fluticasone (FLOVENT HFA) 110 mcg/actuation inhaler; Inhale 2 puffs 2 (two) times a day.  Dispense: 1 Inhaler; Refill: 12  Prn albuterol    Colon cancer screening  -     Ambulatory referral for Colonoscopy    Visit for screening mammogram  -     Mammo Screening Bilateral; Future; Expected date: 5/15/18    Orders Placed This Encounter   Procedures     Mammo Screening Bilateral     Standing Status:   Future     Standing Expiration Date:   8/15/2019     Order Specific Question:   Patient's previous breast density:     Answer:   Scattered fibroglandular density [2]     Order Specific Question:   Is the patient pregnant?     Answer:   No     Order Specific Question:   Can the procedure be changed per Radiologist protocol?     Answer:   Yes     Ambulatory referral for Colonoscopy     Referral Priority:   Routine     Referral Type:   Colonoscopy     Requested Specialty:   Gastroenterology     Number of Visits Requested:   1       CHIEF COMPLAINT:  Chief Complaint   Patient presents with     mole on the neck     Medication Refill       HISTORY OF PRESENT ILLNESS:  Martine is a 47 y.o. female presenting to the clinic today for evaluation of a skin lesion. She has a lesion on her left sided neck. She was having some tenderness over it from seat belts and clothing. She was keeping a band aid on the area to prevent rubbing on it the spot. She did notice it bled a little bit. She describes the lesion as a mole on a mole, as it was very raised. When she took the band aid off this morning, the lesion was not there any more.     Asthma: This is stable. She is doing the  daily Flovent, and using albuterol before exercising and as she needs it. She is not taking any allergy medication.     Hypertension: Her blood pressure is stable today at 114/68. She takes amlodipine 10mg for her blood pressure.     Health Maintenance: She is due for a mammogram and colonoscopy.     REVIEW OF SYSTEMS:   Constitutional: Negative.   HENT: Negative.   Eyes: Negative.   Respiratory: Negative.   Cardiovascular: Negative.   Gastrointestinal: Negative.   Endocrine: Negative.   Genitourinary: Negative.   Musculoskeletal: Negative.   Allergic/Immunologic: Negative.   Neurological: Negative.   Hematological: Negative.   Psychiatric/Behavioral: Negative.   All other systems are negative.    PFSH:  No new history.     TOBACCO USE:  History   Smoking Status     Never Smoker   Smokeless Tobacco     Never Used       VITALS:  Vitals:    05/15/18 1254   BP: 114/68   Pulse: 76   Weight: 166 lb 3 oz (75.4 kg)     Wt Readings from Last 3 Encounters:   05/15/18 166 lb 3 oz (75.4 kg)   03/28/17 170 lb 8 oz (77.3 kg)   01/12/17 160 lb (72.6 kg)       PHYSICAL EXAM:  Constitutional: Patient is oriented to person, place, and time. Patient appears well-developed and well-nourished. No distress.   Cardiovascular: Normal rate.  Pulmonary/Chest: Effort normal. No respiratory distress.   Neurological: Patient is alert and oriented to person, place, and time.  Skin: Skin is warm and dry. 3-4mm hyperpigmented raised lesion on the left side of her neck.     Results for orders placed or performed during the hospital encounter of 01/11/17   Beta-hCG, Qualitative, Serum   Result Value Ref Range    Beta hCG Qualitative Negative Negative   APTT(PTT)   Result Value Ref Range    PTT 28 24 - 37 seconds   INR   Result Value Ref Range    INR 1.04 0.90 - 1.10   Platelet Count   Result Value Ref Range    Platelets 346 140 - 440 thou/uL   Surgical Pathology Exam   Result Value Ref Range    Case Report       Surgical Pathology                    "             Case: [id number]                                   Authorizing Provider:  Stalin Pool MD         Collected:           01/11/2017 2155              Ordering Location:     [facility] OR Received:            01/12/2017 0936              Pathologist:           Crow Perez MD PhD                                                        Specimen:    Appendix, APPENDIX                                                                         Final Diagnosis       APPENDIX, APPENDECTOMY:    - ACUTE APPENDICITIS AND PERIAPPENDICITIS    Clinical Information       Pre-op Diagnosis: Abdominal pain [R10.9]  Time in Formalin: 09:56 pm    Gross Description       Received in formalin, labeled with the patient's name and \"appendix,\" is a proximally stapled 8.5 cm in length ,0.7 cm in external diameter appendix with a moderate amount of attached mesoappendix. The serosa is tan-pink to red and focally hemorrhagic. The appendiceal wall averages 0.2 cm in thickness and is grossly intact. The proximal margin is inked black. RS-1C  RJR:dls    Charges CPT:  76710   ICD-10:  K35.3     Result Flag  Normal           ADDITIONAL HISTORY SUMMARIZED (2): None.  DECISION TO OBTAIN EXTRA INFORMATION (1): None.   RADIOLOGY TESTS (1): None.  LABS (1): None.  MEDICINE TESTS (1): None.  INDEPENDENT REVIEW (2 each): None.     ISally, am scribing for and in the presence of, Dr. Bhakta.    Sindhu POTTER MD , personally performed the services described in this documentation, as scribed by Sally Boothe in my presence, and it is both accurate and complete.    MEDICATIONS:  Current Outpatient Prescriptions   Medication Sig Dispense Refill     acyclovir (ZOVIRAX) 400 MG tablet One tablet every other day 45 tablet 3     albuterol (VENTOLIN HFA) 90 mcg/actuation inhaler INHALE 2 PUFFS EVERY 6 HOURS AS NEEDED FOR WHEEZING 18 g 2     amLODIPine (NORVASC) 10 MG tablet TAKE 1 TABLET BY MOUTH " DAILY 90 tablet 0     b complex vitamins tablet Take 1 tablet by mouth daily.       docoshexanoic acid-eicosapent 500 mg (FISH OIL) 500-100 mg cap capsule Take 2,000 mg by mouth daily.       fluticasone (FLOVENT HFA) 110 mcg/actuation inhaler Inhale 2 puffs 2 (two) times a day. 1 Inhaler 12     multivitamin therapeutic (THERAGRAN) tablet Take 1 tablet by mouth daily.       norethindrone-ethinyl estradiol (ORTHO-NOVUM 1-35 TAB,NORTREL 1-35 TAB) 1-35 mg-mcg per tablet Take 1 tablet by mouth daily.       No current facility-administered medications for this visit.        Total data points: 0

## 2021-06-20 NOTE — LETTER
Letter by Sindhu Calle MD at      Author: Sindhu Calle MD Service: -- Author Type: --    Filed:  Encounter Date: 1/9/2020 Status: Signed         January 9, 2020     Patient: Martine Biswas   YOB: 1970   Date of Visit: 1/9/2020       To Whom it May Concern:    Martine Biswas was seen in my clinic on 1/9/2020.  Martine Biswas  is a patient of this clinic.  This letter serves as documentation of medical necessity.  It is my medical opinion that Martine Biswas  be provided with a sit-to-stand desk for work-related responsibilities.     If you have any questions or concerns, please don't hesitate to call.    Sincerely,         Electronically signed by Sindhu Bah MD

## 2021-06-24 NOTE — TELEPHONE ENCOUNTER
RN cannot approve Refill Request    RN can NOT refill this medication overdue for office visits and/or labs.    Arthur De La Rosa, [facility] Triage/Med Refill 3/14/2019    Requested Prescriptions   Pending Prescriptions Disp Refills     PROAIR HFA 90 mcg/actuation inhaler [Pharmacy Med Name: PROAIR HFA ORAL INH (200  PFS) 8.5G] 8.5 g 0     Sig: INHALE 2 PUFFS BY MOUTH EVERY 6 HOURS AS NEEDED FOR WHEEZING    Albuterol/Levalbuterol Refill Protocol Failed - 3/10/2019 10:10 AM       Failed - PCP or prescribing provider visit in last year    Last office visit with prescriber/PCP: 3/28/2017 Sindhu Calle MD OR same dept: Visit date not found OR same specialty: 3/28/2017 Sindhu Calle MD Last physical: Visit date not found       Next appt within 3 mo: Visit date not found  Next physical within 3 mo: Visit date not found  Prescriber OR PCP: Sindhu Bah MD  Last diagnosis associated with med order: 1. Mild persistent asthma with exacerbation  - PROAIR HFA 90 mcg/actuation inhaler [Pharmacy Med Name: PROAIR HFA ORAL INH (200  PFS) 8.5G]; INHALE 2 PUFFS BY MOUTH EVERY 6 HOURS AS NEEDED FOR WHEEZING  Dispense: 8.5 g; Refill: 0    If protocol passes may refill for 6 months if within 3 months of last provider visit (or a total of 9 months). If patient requesting >1 inhaler per month refill x 6 months and have patient make appointment with provider.

## 2021-06-28 NOTE — PROGRESS NOTES
Progress Notes by Sindhu Calle MD at 1/9/2020 10:00 AM     Author: Sindhu Calle MD Service: -- Author Type: Physician    Filed: 1/9/2020 11:03 AM Encounter Date: 1/9/2020 Status: Signed    : Sindhu Calle MD (Physician)       FEMALE PREVENTATIVE EXAM    Assessment and Plan:     Routine general medical examination at a health care facility  -     Td, Preservative Free (green label)  We discussed healthy lifestyle, nutrition, cardiovascular risk reduction, self care, safety, sunscreen, seatbelt, and timing of cancer screening.  Health maintenance screening and immunizations reviewed with the patient.  She will get her Pap smear through her gynecologist.  She is anticipating fasting labs with her gynecologist next month    Visit for screening mammogram  -     Mammo Screening Bilateral; Future    Mild persistent asthma without complication  ACT=23  flovent daily and prn albuterol    Essential hypertension  Switch amlodipine to chlorthalidone because of mild pedal edema  Come back in 3 months for blood pressure recheck  -     chlorthalidone (HYGROTEN) 25 MG tablet; Take 1 tablet (25 mg total) by mouth daily.    Herpes Simplex Type I, nose  Switch acyclovir to valacyclovir for ease of administration and compliance  -     valACYclovir (VALTREX) 1000 MG tablet; Take 2 tablets (2,000 mg total) by mouth 2 (two) times a day.    Rash  Dermatitis likely xerosis  We will give her triamcinolone and advised eucerin  -     triamcinolone (KENALOG) 0.5 % ointment; Apply to neck and chest three times daily as needed    Skin lesions  Referral to derm for skin cancer screening  -     Ambulatory referral to Dermatology    Leg swelling  Likely due to amlodipine.  We will stop amlodipine and switch over to chlorthalidone for blood pressure control    Arthralgia, unspecified joint  Advised optimal nutrition, exercise, sleep and improvement of overall activities of daily living    Next  follow up:  Return in about 3 months (around 4/9/2020).    Immunization Review  Adult Imm Review: Due today, orders placed    I discussed the following with the patient:   Adult Healthy Living: Importance of regular exercise  Healthy nutrition  Getting adequate sleep  Stress management  Use of seat belts  Supplement use    Subjective:   Chief Complaint: Martine Biswas is an 49 y.o. female here for a preventative health visit.     HPI:   Asthma: She has a history of asthma. She takes flovent two puffs twice a day. She only uses her albuterol if she walks with her  because he is a fast walker. Otherwise, she does not use her albuterol. She believes that her asthma is well controled.     Herpes Simplex: She has a history of outbreaks on her nose. She takes acyclovir 400 mg once a day. She used to have an outbreak once a year and would only take acyclovir as needed. Her prescription was changed to once every other day to try to prevent break outs. She noticed that she is still having a few break outs once a year. She inquires if she can change her prescription to as needed rather than every other day.     Rash: She has had rashes on the back of her neck for about a year. It is itchy. She has night sweats now which makes the itching worse. She has tried hydrocortisone cream without any relief. She has also noticed some red bumps on her chest. She inquires about ways to treat them. She does not see a dermatologist regularly.     Hypertension/Ankle Edema: She has a history of hypertension. She takes amlodipine 10 mg once a day. Her ankles and feet bilaterally swell. She notes that it is worse during the summer time. She will have difficulty seeing her ankles or feet. The winter is not nearly as bad. They also swell worse at the end of the day or if she drinks a lot of water that day. She has never tried any other blood pressure medications other than amlodipine.     Joint Pain: She feels like her joints ache  whenever she sits too long or gets up in the middle of the night. She stretches everyday without any relief. She would like to have a stand-up desk at work to help relieve the pain. However, she needs a note from a doctor to get a standing desk. She also inquires about what may be causing her joint pain and at-home ways to relieve the pain.     Health Maintenance: She is not fasting, but she will return to the gynecologist in a month to do fasting labs. She will upload the results to [email or web address]. She just came from her gynecologist where she had a pap smear completed. She will upload the results when she gets them. She has been on birth control since she was 14 years old. She will be stopping the birth control on Saturday. She had a negative mammogram in 2018. She still has to schedule the mammogram. She had a colonoscopy in 2018 which showed many polyps. The patient is on a 3 year plan for her colonoscopy. She is due for a tetanus vaccination today.     Review of Systems:   Negative for: abnormal bowel movements, chest pain, shortness of breath or urinary symptoms.   Please see above. The rest of the review of systems are negative for all systems.     PSFH:  There is no family history of skin or breast cancer. Her mother has atrial fibrillation. Her mother did not have any thyroid problems.     Healthy Habits  Are you taking a daily aspirin? No  Do you typically exercising at least 40 min, 3-4 times per week?  NO  Do you usually eat at least 4 servings of fruit and vegetables a day, include whole grains and fiber and avoid regularly eating high fat foods? Yes  Have you had an eye exam in the past two years? NO  Do you see a dentist twice per year? Yes  Do you have any concerns regarding sleep? YES    Safety Screen  If you own firearms, are they secured in a locked gun cabinet or with trigger locks? The patient does not own any firearms  No data recorded    Pap History:   No - age 30-65 PAP every 3 years  "recommended  Cancer Screening       Status Date      MAMMOGRAM Next Due 12/18/2019      Done 12/18/2018 MAMMO SCREENING BILATERAL     Patient has more history with this topic...    PAP SMEAR Next Due 1/25/2021      Done 1/25/2016      Patient has more history with this topic...    COLONOSCOPY Next Due 12/6/2021      Done 12/6/2018 COLONOSCOPY EXTERNAL RESULT        Patient Care Team:  Sindhu Calle MD as PCP - General (Family Medicine)  Sindhu Calle MD as Assigned PCP    History     Reviewed By Date/Time Sections Reviewed    Ed Cool CMA 1/9/2020 10:10 AM Tobacco        Objective:   Vital Signs:   Visit Vitals  /80 (Patient Site: Left Arm, Patient Position: Sitting, Cuff Size: Adult Regular)   Pulse 76   Ht 5' 5.5\" (1.664 m)   Wt 168 lb (76.2 kg)   LMP 12/17/2019   BMI 27.53 kg/m         PHYSICAL EXAM  Constitutional: Patient is oriented to person, place, and time. Patient appears well-developed and well-nourished. No distress.   Head: Normocephalic and atraumatic.   Right Ear: External ear normal.   Left Ear: External ear normal.   Nose: Nose normal.   Mouth/Throat: Oropharynx is clear and moist. No oropharyngeal exudate.   Eyes: Conjunctivae and EOM are normal. Pupils are equal, round, and reactive to light. Right eye exhibits no discharge. Left eye exhibits no discharge. No scleral icterus.   Neck: Neck supple. No JVD present. No tracheal deviation present. No thyromegaly present.   Breasts:  Normal appearing, no skin involvement, no palpable mass, no tenderness on palpation.  No axillary involvement  Cardiovascular: Normal rate, regular rhythm, normal heart sounds and intact distal pulses.   No murmur heard.   Pulmonary/Chest: Effort normal and breath sounds normal. No stridor. No respiratory distress. Patient has no wheezes, no rales, exhibits no tenderness.   Abdominal: Soft. Bowel sounds are normal. Patient exhibits no distension and no mass. There is no " tenderness. There is no rebound and no guarding.   Lymphadenopathy:  Patient has no cervical adenopathy.   Neurological: Patient is alert and oriented to person, place, and time. Patient has normal reflexes. No cranial nerve deficit. Coordination normal.   Skin: Skin is warm and dry. No rash noted. Patient is not diaphoretic. No erythema. No pallor. There are excoriation marks on the back of her neck. There are excoriation marks and irritation along her neck. There are some lesions on the chest.   Psychiatric: Patient has good eye contact without any psychomotor retardation or stereotypic behaviors.  normal mood and affect. Judgment and thought content normal.   Speech is regular rate and rhythm.     The ASCVD Risk score (Dina DC Jr., et al., 2013) failed to calculate for the following reasons:    Cannot find a previous HDL lab    Cannot find a previous total cholesterol lab    ADDITIONAL HISTORY SUMMARIZED (2): Reviewed colonoscopy from 12/06/18 which showed colon polyps.   DECISION TO OBTAIN EXTRA INFORMATION (1): None.   RADIOLOGY TESTS (1): None.  LABS (1): None.  MEDICINE TESTS (1): None.  INDEPENDENT REVIEW (2 each): None.     Inna POTTER, am scribing for and in the presence of, Dr. Bhakta.    IDr. Bhakta, personally performed the services described in this documentation, as scribed by Inna Gonzalez in my presence, and it is both accurate and complete.    Total data points: 2       Medication List          Accurate as of January 9, 2020 10:59 AM. If you have any questions, ask your nurse or doctor.            START taking these medications    chlorthalidone 25 MG tablet  Also known as:  HYGROTEN  INSTRUCTIONS:  Take 1 tablet (25 mg total) by mouth daily.  Started by:  Sindhu Bah MD        triamcinolone 0.5 % ointment  Also known as:  KENALOG  INSTRUCTIONS:  Apply to neck and chest three times daily as needed  Started by:  Sindhu Bah MD        valACYclovir 1000 MG  tablet  Also known as:  Valtrex  INSTRUCTIONS:  Take 2 tablets (2,000 mg total) by mouth 2 (two) times a day.  Started by:  Sindhu Bah MD           CHANGE how you take these medications    albuterol 90 mcg/actuation inhaler  Also known as:  Ventolin HFA  INSTRUCTIONS:  INHALE 2 PUFFS EVERY 6 HOURS AS NEEDED FOR WHEEZING  Doctor's comments:  May substitute the equivalent medication per insurance preference.  What changed:  Another medication with the same name was removed. Continue taking this medication, and follow the directions you see here.  Changed by:  Sindhu Bah MD        amLODIPine 10 MG tablet  Also known as:  NORVASC  INSTRUCTIONS:  Take 1 tablet (10 mg total) by mouth daily.  What changed:  Another medication with the same name was removed. Continue taking this medication, and follow the directions you see here.  Changed by:  Sindhu Bah MD           CONTINUE taking these medications    b complex vitamins tablet  INSTRUCTIONS:  Take 1 tablet by mouth daily.        docoshexanoic acid-eicosapent 500 mg 500-100 mg Cap capsule  Also known as:  FISH OIL  INSTRUCTIONS:  Take 2,000 mg by mouth daily.        fluticasone propionate 110 mcg/actuation inhaler  Also known as:  Flovent HFA  INSTRUCTIONS:  Inhale 2 puffs 2 (two) times a day.        multivitamin therapeutic tablet  Also known as:  THERAGRAN  INSTRUCTIONS:  Take 1 tablet by mouth daily.           STOP taking these medications    acyclovir 400 MG tablet  Also known as:  ZOVIRAX  Stopped by:  Sindhu Bah MD     norethindrone-ethinyl estradiol 1-35 mg-mcg per tablet  Also known as:  ORTHO-NOVUM 1-35 TAB,NORTREL 1-35 TAB  Stopped by:  Sindhu Bah MD           Where to Get Your Medications      [id number] - [place] - [facility] [address] [facility]  [address] [place] [address]    Phone:   [phone]     albuterol 90 mcg/actuation inhaler    amLODIPine 10 MG tablet    chlorthalidone 25 MG tablet    fluticasone propionate 110 mcg/actuation inhaler    triamcinolone 0.5 % ointment    valACYclovir 1000 MG tablet         Additional Screenings Completed Today:

## 2021-07-03 NOTE — ADDENDUM NOTE
Addendum Note by Delfina Street at 2/12/2020 11:59 PM     Author: Delfina Street Service: -- Author Type: --    Filed: 2/18/2020  1:59 PM Encounter Date: 2/12/2020 Status: Signed    : Delfina Street    Addended by: DELFINA STREET on: 2/18/2020 01:59 PM        Modules accepted: Orders

## 2021-07-03 NOTE — ADDENDUM NOTE
Addendum Note by Hailey Cool CMA at 3/4/2020  3:40 PM     Author: Hailey Cool CMA Service: -- Author Type: Certified Medical Assistant    Filed: 3/4/2020  4:43 PM Encounter Date: 3/4/2020 Status: Signed    : Hailey Cool CMA (Certified Medical Assistant)    Addended by: HAILEY COOL on: 3/4/2020 04:43 PM        Modules accepted: Orders

## 2021-07-20 NOTE — PROGRESS NOTES
HPI: Pt is here for follow up of a lap appy.   she is doing well.  Pain is well controlled.  No difficulties with the surgical wound/wounds.  she is eating well and denies fever and chills.         Visit Vitals     [facility] 12/28/2016       EXAM:  GENERAL:Appears well  ABDOMEN:  Soft, +BS  SURGICAL WOUNDS:  Incisions healing well, no enduration or drainage.        Assessment/Plan: . Doing well after surgery and should follow up as needed.      Derik Bonner, [facility] Department of Surgery     This note has been dictated.

## 2021-07-27 ENCOUNTER — VIRTUAL VISIT (OUTPATIENT)
Dept: URGENT CARE | Facility: CLINIC | Age: 51
End: 2021-07-27
Payer: COMMERCIAL

## 2021-07-27 DIAGNOSIS — J04.0 LARYNGITIS, ACUTE: Primary | ICD-10-CM

## 2021-07-27 PROCEDURE — 99212 OFFICE O/P EST SF 10 MIN: CPT | Mod: TEL

## 2021-07-27 NOTE — PROGRESS NOTES
Selma is a 50 year old who is being evaluated via a billable telephone visit.      How would you like to obtain your AVS? MyChart  If the video visit is dropped, the invitation should be resent by:   Will anyone else be joining your video visit? No      Video Start Time: 6:05 PM    Assessment & Plan     Laryngitis, acute  Work note provided.   See Patient Instructions    Return in about 1 week (around 8/3/2021) for If not better, sooner if worsening.    Diagnosis and treatment plan were discussed with patient and/or parent. If symptoms worsen or do not improve in the next few days, follow-up with your primary care provider or visit an [facility] urgent care clinic location.  Patient verbalizes understanding of all things discussed. All questions were addressed and answered.       Selma Dunn PA-C    [facility]  [facility]    Subjective   Selma is a 50 year old who presents for the following health issues lost her voice/sore throat    HPI     She has lost her voice for the last 3 days. She had a sore throat before then.   Her throat is more sore at night, but it is getting a bit better.   She has tried lozenges and tea. No fevers or chills.   She states she is worried about mono because her friends think she has mono.    Review of Systems   Constitutional, HEENT, cardiovascular, pulmonary, gi and gu systems are negative, except as otherwise noted.      Objective           Vitals:  No vitals were obtained today due to virtual visit.    Physical Exam   GENERAL: Healthy, alert and no distress  EYES: Eyes grossly normal to inspection.  No discharge or erythema, or obvious scleral/conjunctival abnormalities.  RESP: No audible wheeze, cough, or visible cyanosis.  No visible retractions or increased work of breathing.    SKIN: Visible skin clear. No significant rash, abnormal pigmentation or lesions.  NEURO: Cranial nerves grossly intact.  Mentation and speech appropriate for  age.  PSYCH: Mentation appears normal, affect normal/bright, judgement and insight intact, normal speech and appearance well-groomed.        Phone call duration: 9 minutes    Patient could not get the video visit to work.

## 2021-07-27 NOTE — PATIENT INSTRUCTIONS
Patient Education     Laryngitis    Laryngitis is a swelling of the tissues around the vocal cords. Symptoms include a hoarse (scratchy) voice. Or your voice may be gone for a few days or longer. This may be caused by a viral illness, such as a head or chest cold. It may also be due to overuse and strain of your voice. Smoking, drinking alcohol, acid reflux, allergies, or inhaling harsh chemicals may also lead to symptoms. This condition will usually go away in 1 to 2 weeks.   Home care    Rest your voice until it recovers. Talk as little as possible. If your symptoms are severe, rest at home for a day or so.    Moist air may help your symptoms. Try breathing cool steam from a humidifier or vaporizer. Or breathe air from a steamy shower.    Drink plenty of fluids to stay well hydrated.    Don't smoke    Follow-up care  Follow up with your healthcare provider or this facility if you are not better after 1 week. If your hoarse voice lasts more than 2 weeks, you may need to see an otolaryngologist. This is a doctor who treats diseases and disorders of the ear, nose, and throat (ENT). Seeing this doctor is especially important if you have a history of alcohol or tobacco use.   When to seek medical advice  Contact your healthcare provider right away if you have any of the following:     Symptoms that get worse    Severe pain with swallowing    Trouble opening your mouth    Neck swelling, neck pain, or trouble moving your neck    Fever of 100.4 F (38. C) or higher, or as directed by your healthcare provider    Symptoms do not go away in 2 weeks  Call 911  Call 911 or seek immediate medical care if you have any of the following:     Noisy breathing or trouble breathing    Drooling or not able to swallow    Not able to talk    Feeling dizzy or lightheaded  [employer] last reviewed this educational content on 4/1/2020 2000-2021 [employer]. All rights reserved. This information is not intended as a substitute  for professional medical care. Always follow your healthcare professional's instructions.

## 2021-07-27 NOTE — LETTER
July 27, 2021      Martine Biswas  [address] [address]        To Whom It May Concern:    Martine Biswas  was seen on 7/27/2021.  Please allow her to rest her voice/avoid being on the phone until 8/3/2021 due to illness.        Sincerely,    Selma Dunn PA-C    [facility]

## 2021-10-03 ENCOUNTER — HEALTH MAINTENANCE LETTER (OUTPATIENT)
Age: 51
End: 2021-10-03

## 2021-11-11 ENCOUNTER — IMMUNIZATION (OUTPATIENT)
Dept: NURSING | Facility: CLINIC | Age: 51
End: 2021-11-11
Payer: COMMERCIAL

## 2021-11-11 PROCEDURE — 0004A PR COVID VAC PFIZER DIL RECON 30 MCG/0.3 ML IM: CPT

## 2021-11-11 PROCEDURE — 91300 PR COVID VAC PFIZER DIL RECON 30 MCG/0.3 ML IM: CPT

## 2021-11-24 ENCOUNTER — HOSPITAL ENCOUNTER (OUTPATIENT)
Dept: MAMMOGRAPHY | Facility: CLINIC | Age: 51
Discharge: HOME | End: 2021-11-24
Attending: FAMILY MEDICINE | Admitting: FAMILY MEDICINE
Payer: COMMERCIAL

## 2021-11-24 DIAGNOSIS — Z12.31 VISIT FOR SCREENING MAMMOGRAM: ICD-10-CM

## 2021-11-24 PROCEDURE — 77067 SCR MAMMO BI INCL CAD: CPT

## 2022-04-13 ENCOUNTER — MYC MEDICAL ADVICE (OUTPATIENT)
Dept: FAMILY MEDICINE | Facility: CLINIC | Age: 52
End: 2022-04-13
Payer: COMMERCIAL

## 2022-04-13 DIAGNOSIS — I10 ESSENTIAL HYPERTENSION: ICD-10-CM

## 2022-04-18 RX ORDER — CHLORTHALIDONE 25 MG/1
25 TABLET ORAL DAILY
Qty: 90 TABLET | Refills: 0 | Status: SHIPPED | OUTPATIENT
Start: 2022-04-18 | End: 2022-05-02

## 2022-04-18 NOTE — TELEPHONE ENCOUNTER
Patient called to let PCP know that she scheduled her annual physical appointment but she can't get in for a office visit until 05/02/2022. Patient states she has been out of her blood pressure medication for a few days and she is wondering if she can get a refill sent to her pharmacy until she is able to come in for appointment. Please call patient with any questions/concerns.    Selma Barros

## 2022-04-19 ENCOUNTER — MYC REFILL (OUTPATIENT)
Dept: FAMILY MEDICINE | Facility: CLINIC | Age: 52
End: 2022-04-19
Payer: COMMERCIAL

## 2022-04-19 DIAGNOSIS — I10 ESSENTIAL HYPERTENSION: ICD-10-CM

## 2022-04-21 RX ORDER — CHLORTHALIDONE 25 MG/1
25 TABLET ORAL DAILY
Qty: 90 TABLET | Refills: 0 | OUTPATIENT
Start: 2022-04-21

## 2022-04-21 NOTE — TELEPHONE ENCOUNTER
Refused refill request as duplicate.   Filled 4/18/2022  Radha Farley, RN   04/21/22 10:18 AM  [facility] Nurse Advisor

## 2022-05-02 ENCOUNTER — OFFICE VISIT (OUTPATIENT)
Dept: FAMILY MEDICINE | Facility: CLINIC | Age: 52
End: 2022-05-02
Payer: COMMERCIAL

## 2022-05-02 VITALS
OXYGEN SATURATION: 97 % | SYSTOLIC BLOOD PRESSURE: 122 MMHG | WEIGHT: 171 LBS | HEART RATE: 90 BPM | BODY MASS INDEX: 28.49 KG/M2 | DIASTOLIC BLOOD PRESSURE: 76 MMHG | HEIGHT: 65 IN

## 2022-05-02 DIAGNOSIS — J45.30 MILD PERSISTENT ASTHMA WITHOUT COMPLICATION: ICD-10-CM

## 2022-05-02 DIAGNOSIS — Z00.00 ROUTINE ADULT HEALTH MAINTENANCE: Primary | ICD-10-CM

## 2022-05-02 DIAGNOSIS — B00.9 HERPES SIMPLEX VIRUS (HSV) INFECTION: ICD-10-CM

## 2022-05-02 DIAGNOSIS — Z23 HIGH PRIORITY FOR 2019-NCOV VACCINE: ICD-10-CM

## 2022-05-02 DIAGNOSIS — N39.3 STRESS INCONTINENCE OF URINE: ICD-10-CM

## 2022-05-02 DIAGNOSIS — I10 PRIMARY HYPERTENSION: ICD-10-CM

## 2022-05-02 LAB
ALBUMIN SERPL-MCNC: 3.9 G/DL (ref 3.5–5)
ALP SERPL-CCNC: 77 U/L (ref 45–120)
ALT SERPL W P-5'-P-CCNC: 25 U/L (ref 0–45)
ANION GAP SERPL CALCULATED.3IONS-SCNC: 15 MMOL/L (ref 5–18)
AST SERPL W P-5'-P-CCNC: 31 U/L (ref 0–40)
BILIRUB SERPL-MCNC: 0.5 MG/DL (ref 0–1)
BUN SERPL-MCNC: 21 MG/DL (ref 8–22)
CALCIUM SERPL-MCNC: 10.2 MG/DL (ref 8.5–10.5)
CHLORIDE BLD-SCNC: 96 MMOL/L (ref 98–107)
CO2 SERPL-SCNC: 30 MMOL/L (ref 22–31)
CREAT SERPL-MCNC: 0.72 MG/DL (ref 0.6–1.1)
GFR SERPL CREATININE-BSD FRML MDRD: >90 ML/MIN/1.73M2
GLUCOSE BLD-MCNC: 105 MG/DL (ref 70–125)
POTASSIUM BLD-SCNC: 3.3 MMOL/L (ref 3.5–5)
PROT SERPL-MCNC: 7.6 G/DL (ref 6–8)
SODIUM SERPL-SCNC: 141 MMOL/L (ref 136–145)

## 2022-05-02 PROCEDURE — 99214 OFFICE O/P EST MOD 30 MIN: CPT | Mod: 25 | Performed by: FAMILY MEDICINE

## 2022-05-02 PROCEDURE — 91305 COVID-19,PF,PFIZER (12+ YRS): CPT | Performed by: FAMILY MEDICINE

## 2022-05-02 PROCEDURE — 99396 PREV VISIT EST AGE 40-64: CPT | Mod: 25 | Performed by: FAMILY MEDICINE

## 2022-05-02 PROCEDURE — 80053 COMPREHEN METABOLIC PANEL: CPT | Performed by: FAMILY MEDICINE

## 2022-05-02 PROCEDURE — 36415 COLL VENOUS BLD VENIPUNCTURE: CPT | Performed by: FAMILY MEDICINE

## 2022-05-02 PROCEDURE — 0054A COVID-19,PF,PFIZER (12+ YRS): CPT | Performed by: FAMILY MEDICINE

## 2022-05-02 RX ORDER — VALACYCLOVIR HYDROCHLORIDE 1 G/1
2000 TABLET, FILM COATED ORAL 2 TIMES DAILY
Qty: 4 TABLET | Refills: 11 | Status: SHIPPED | OUTPATIENT
Start: 2022-05-02 | End: 2022-10-05

## 2022-05-02 RX ORDER — ALBUTEROL SULFATE 90 UG/1
AEROSOL, METERED RESPIRATORY (INHALATION)
Qty: 18 G | Refills: 11 | Status: SHIPPED | OUTPATIENT
Start: 2022-05-02 | End: 2022-10-11

## 2022-05-02 RX ORDER — CHLORTHALIDONE 25 MG/1
25 TABLET ORAL DAILY
Qty: 90 TABLET | Refills: 3 | Status: SHIPPED | OUTPATIENT
Start: 2022-05-02

## 2022-05-02 RX ORDER — AMLODIPINE BESYLATE 5 MG/1
1 TABLET ORAL DAILY
COMMUNITY
End: 2022-05-02 | Stop reason: ALTCHOICE

## 2022-05-02 RX ORDER — MONTELUKAST SODIUM 10 MG/1
10 TABLET ORAL AT BEDTIME
Qty: 90 TABLET | Refills: 3 | Status: SHIPPED | OUTPATIENT
Start: 2022-05-02

## 2022-05-02 RX ORDER — DEXAMETHASONE 4 MG/1
2 TABLET ORAL 2 TIMES DAILY
Qty: 12 G | Refills: 11 | Status: SHIPPED | OUTPATIENT
Start: 2022-05-02 | End: 2022-07-06

## 2022-05-02 ASSESSMENT — ASTHMA QUESTIONNAIRES
ACT_TOTALSCORE: 15
QUESTION_3 LAST FOUR WEEKS HOW OFTEN DID YOUR ASTHMA SYMPTOMS (WHEEZING, COUGHING, SHORTNESS OF BREATH, CHEST TIGHTNESS OR PAIN) WAKE YOU UP AT NIGHT OR EARLIER THAN USUAL IN THE MORNING: TWO OR THREE NIGHTS A WEEK
QUESTION_2 LAST FOUR WEEKS HOW OFTEN HAVE YOU HAD SHORTNESS OF BREATH: ONCE A DAY
QUESTION_1 LAST FOUR WEEKS HOW MUCH OF THE TIME DID YOUR ASTHMA KEEP YOU FROM GETTING AS MUCH DONE AT WORK, SCHOOL OR AT HOME: NONE OF THE TIME
QUESTION_4 LAST FOUR WEEKS HOW OFTEN HAVE YOU USED YOUR RESCUE INHALER OR NEBULIZER MEDICATION (SUCH AS ALBUTEROL): ONE OR TWO TIMES PER DAY
QUESTION_5 LAST FOUR WEEKS HOW WOULD YOU RATE YOUR ASTHMA CONTROL: WELL CONTROLLED
ACT_TOTALSCORE: 15

## 2022-05-02 NOTE — PROGRESS NOTES
SUBJECTIVE:   CC: Martine Biswas is an 51 year old woman who presents for preventive health visit.       Patient has been advised of split billing requirements and indicates understanding: Yes  Healthy Habits:     Getting at least 3 servings of Calcium per day:  Yes    Bi-annual eye exam:  Yes    Dental care twice a year:  Yes    Sleep apnea or symptoms of sleep apnea:  None    Diet:  Regular (no restrictions)    Frequency of exercise:  6-7 days/week    Duration of exercise:  30-45 minutes    Taking medications regularly:  Yes    Medication side effects:  None    PHQ-2 Total Score: 0    Additional concerns today:  Yes    Ability to successfully perform activities of daily living: Yes, no assistance needed  Home safety:  none identified     Today's PHQ-2 Score:   PHQ-2 ( 1999 [employer]) 5/2/2022   Q1: Little interest or pleasure in doing things 0   Q2: Feeling down, depressed or hopeless 0   PHQ-2 Score 0   Q1: Little interest or pleasure in doing things Not at all   Q2: Feeling down, depressed or hopeless Not at all   PHQ-2 Score 0       Abuse: Current or Past (Physical, Sexual or Emotional) - No  Do you feel safe in your environment? Yes    Social History     Tobacco Use     Smoking status: Never Smoker     Smokeless tobacco: Never Used   Substance Use Topics     Alcohol use: Yes     Alcohol/week: 7.0 - 14.0 standard drinks     If you drink alcohol do you typically have >3 drinks per day or >7 drinks per week? Yes      Alcohol Use 5/2/2022   Prescreen: >3 drinks/day or >7 drinks/week? Yes   Prescreen: >3 drinks/day or >7 drinks/week? -   AUDIT SCORE  8       Reviewed orders with patient.  Reviewed health maintenance and updated orders accordingly - Yes  Lab work is in process    Breast Cancer Screening:    Breast CA Risk Assessment (FHS-7) 5/2/2022   Do you have a family history of breast, colon, or ovarian cancer? No / Unknown       click delete button to remove this line now  Mammogram Screening: Recommended  annual mammography  Pertinent mammograms are reviewed under the imaging tab.    History of abnormal Pap smear: NO - age 30-65 PAP every 5 years with negative HPV co-testing recommended     Reviewed and updated as needed this visit by clinical staff   Tobacco  Allergies  Meds  Problems               Reviewed and updated as needed this visit by Provider     Meds  Problems              Past Medical History:   Diagnosis Date     Allergic rhinitis     Created by Conversion  Replacement Utility updated for latest IMO load     Anxiety state     Created by Conversion [employer] [facility] Annotation: May 10 2010 11:58AM - Arthur Winters: Self medicates  with alc. on weekends and currently sees a counselor  Replacement Utility updated for latest I*     Esophageal reflux     Created by Conversion      Essential hypertension     Created by Conversion  Replacement Utility updated for latest IMO load     Other and unspecified hyperlipidemia     Created by Conversion      Unspecified asthma(493.90)     Created by Conversion       Past Surgical History:   Procedure Laterality Date     APPENDECTOMY  01/11/2017    Dr. Stalin WORRELL LAP,APPENDECTOMY N/A 1/11/2017    Procedure: APPENDECTOMY, LAPAROSCOPIC;  Surgeon: Stalin Pool MD;  Location: [facility];  Service: General       Review of Systems  CONSTITUTIONAL: NEGATIVE for fever, chills, change in weight  INTEGUMENTARY/SKIN: NEGATIVE for worrisome rashes, moles or lesions  EYES: NEGATIVE for vision changes or irritation  ENT: NEGATIVE for ear, mouth and throat problems  RESP: NEGATIVE for significant cough or SOB  BREAST: NEGATIVE for masses, tenderness or discharge  CV: NEGATIVE for chest pain, palpitations or peripheral edema  GI: NEGATIVE for nausea, abdominal pain, heartburn, or change in bowel habits  : NEGATIVE for unusual urinary or vaginal symptoms. No vaginal bleeding.  MUSCULOSKELETAL: NEGATIVE for significant arthralgias or myalgia  NEURO: NEGATIVE for  "weakness, dizziness or paresthesias  PSYCHIATRIC: NEGATIVE for changes in mood or affect      OBJECTIVE:   /76 (BP Location: Left arm, Patient Position: Sitting, Cuff Size: Adult Regular)   Pulse 90   Ht 1.657 m (5' 5.25\")   Wt 77.6 kg (171 lb)   SpO2 97%   BMI 28.24 kg/m    Physical Exam  GENERAL APPEARANCE: healthy, alert and no distress  EYES: Eyes grossly normal to inspection, PERRL and conjunctivae and sclerae normal  NECK: no adenopathy, no asymmetry, masses, or scars and thyroid normal to palpation  RESP: lungs clear to auscultation - no rales, rhonchi or wheezes  BREAST: normal without masses, tenderness or nipple discharge and no palpable axillary masses or adenopathy  CV: regular rate and rhythm, normal S1 S2, no S3 or S4, no murmur, click or rub, no peripheral edema and peripheral pulses strong  ABDOMEN: soft, nontender, no hepatosplenomegaly, no masses and bowel sounds normal  MS: no musculoskeletal defects are noted and gait is age appropriate without ataxia  SKIN: no suspicious lesions or rashes  NEURO: Normal strength and tone, sensory exam grossly normal, mentation intact and speech normal  PSYCH: mentation appears normal and affect normal/bright    Diagnostic Test Results:  Labs reviewed in Epic    ASSESSMENT/PLAN:   Martine was seen today for physical and imm/inj.    Diagnoses and all orders for this visit:    Routine adult health maintenance  We discussed healthy lifestyle, nutrition, cardiovascular risk reduction, self care, safety, sunscreen, and timing of cancer screening.  Health maintenance screening and immunizations reviewed with the patient.  Follow up yearly for the annual physical.  She will call with the name of her dermatologist for me to refer for routine skin check    Primary hypertension  -     Comprehensive metabolic panel (BMP + Alb, Alk Phos, ALT, AST, Total. Bili, TP); Future  -     chlorthalidone (HYGROTON) 25 MG tablet; Take 1 tablet (25 mg) by mouth daily  Stable. " " Follow-up yearly    Mild persistent asthma without complication  -     fluticasone (FLOVENT HFA) 110 MCG/ACT inhaler; Inhale 2 puffs into the lungs 2 times daily  -     albuterol (PROAIR HFA/PROVENTIL HFA/VENTOLIN HFA) 108 (90 Base) MCG/ACT inhaler; [ALBUTEROL (VENTOLIN HFA) 90 MCG/ACTUATION INHALER] INHALE 2 PUFFS EVERY 6 HOURS AS NEEDED FOR WHEEZING  -     montelukast (SINGULAIR) 10 MG tablet; Take 1 tablet (10 mg) by mouth At Bedtime  Increase Flovent to twice a day.  Add Singulair.  Continue with daily Claritin.    Herpes simplex virus (HSV) infection, cold sores  -     valACYclovir (VALTREX) 1000 mg tablet; Take 2 tablets (2,000 mg) by mouth 2 times daily  Follow-up as needed    High priority for 2019-nCoV vaccine  -     COVID-19,PF,PFIZER (12+ Yrs GRAY LABEL)    Stress incontinence of urine  -     Adult Urology Referral; Future        Patient has been advised of split billing requirements and indicates understanding: Yes    COUNSELING:  Reviewed preventive health counseling, as reflected in patient instructions    Estimated body mass index is 28.24 kg/m  as calculated from the following:    Height as of this encounter: 1.657 m (5' 5.25\").    Weight as of this encounter: 77.6 kg (171 lb).        She reports that she has never smoked. She has never used smokeless tobacco.      Counseling Resources:  ATP IV Guidelines  Pooled Cohorts Equation Calculator  Breast Cancer Risk Calculator  BRCA-Related Cancer Risk Assessment: FHS-7 Tool  FRAX Risk Assessment  ICSI Preventive Guidelines  Dietary Guidelines for Americans, 2010  USDA's MyPlate  ASA Prophylaxis  Lung CA Screening    Sindhu Bah MD  [facility]"

## 2022-05-03 ENCOUNTER — MYC MEDICAL ADVICE (OUTPATIENT)
Dept: FAMILY MEDICINE | Facility: CLINIC | Age: 52
End: 2022-05-03
Payer: COMMERCIAL

## 2022-05-03 DIAGNOSIS — Z12.83 SKIN CANCER SCREENING: Primary | ICD-10-CM

## 2022-05-05 ENCOUNTER — PRE VISIT (OUTPATIENT)
Dept: UROLOGY | Facility: CLINIC | Age: 52
End: 2022-05-05
Payer: COMMERCIAL

## 2022-05-05 NOTE — TELEPHONE ENCOUNTER
Reason for visit: Stress incontinence      Relevant information: New consult    Records/imaging/labs/orders: records available    Pt called: no need for a call      Mabel Matute CMA  5/5/2022  11:53 AM

## 2022-06-28 ENCOUNTER — TRANSFERRED RECORDS (OUTPATIENT)
Dept: HEALTH INFORMATION MANAGEMENT | Facility: CLINIC | Age: 52
End: 2022-06-28

## 2022-07-05 DIAGNOSIS — J45.30 MILD PERSISTENT ASTHMA WITHOUT COMPLICATION: ICD-10-CM

## 2022-07-06 RX ORDER — FLUTICASONE PROPIONATE 110 UG/1
2 AEROSOL, METERED RESPIRATORY (INHALATION) 2 TIMES DAILY
Qty: 12 G | Refills: 11 | Status: SHIPPED | OUTPATIENT
Start: 2022-07-06 | End: 2022-10-10

## 2022-09-10 ENCOUNTER — HEALTH MAINTENANCE LETTER (OUTPATIENT)
Age: 52
End: 2022-09-10

## 2022-10-05 DIAGNOSIS — B00.9 HERPES SIMPLEX VIRUS (HSV) INFECTION: ICD-10-CM

## 2022-10-05 RX ORDER — VALACYCLOVIR HYDROCHLORIDE 1 G/1
2000 TABLET, FILM COATED ORAL 2 TIMES DAILY
Qty: 4 TABLET | Refills: 11 | Status: SHIPPED | OUTPATIENT
Start: 2022-10-05

## 2022-10-06 NOTE — TELEPHONE ENCOUNTER
"Last Written Prescription Date:  5/2/2022  Last Fill Quantity: 4,  # refills: 11   Last office visit provider:  5/2/2022     Requested Prescriptions   Pending Prescriptions Disp Refills     valACYclovir (VALTREX) 1000 mg tablet 4 tablet 11     Sig: Take 2 tablets (2,000 mg) by mouth 2 times daily       Antivirals for Herpes Protocol Passed - 10/5/2022  3:50 PM        Passed - Patient is age 12 or older        Passed - Recent (12 mo) or future (30 days) visit within the authorizing provider's specialty     Patient has had an office visit with the authorizing provider or a provider within the authorizing providers department within the previous 12 mos or has a future within next 30 days. See \"Patient Info\" tab in inbasket, or \"Choose Columns\" in Meds & Orders section of the refill encounter.              Passed - Medication is active on med list        Passed - Normal serum creatinine on file in past 12 months     Recent Labs   Lab Test 05/02/22  1546   CR 0.72       Ok to refill medication if creatinine is low               Martine Alba RN 10/05/22 10:54 PM  "

## 2022-10-07 ENCOUNTER — TELEPHONE (OUTPATIENT)
Dept: FAMILY MEDICINE | Facility: CLINIC | Age: 52
End: 2022-10-07

## 2022-10-07 DIAGNOSIS — J45.30 MILD PERSISTENT ASTHMA WITHOUT COMPLICATION: ICD-10-CM

## 2022-10-07 NOTE — TELEPHONE ENCOUNTER
Medication Question    Contacts       Type Contact Phone/Fax    10/07/2022 03:13 PM CDT Phone (Incoming) Selma Biswas (Self) [phone] (M)      Patient called to say her insurance company will no longer cover the medication below.  What medication would you recommend?  Please advise.    Patient also requested a call from provider's team. Stating she heard her PCP is leaving the clinic?     What medication are you calling about (include dose and sig)?:     fluticasone (FLOVENT HFA) 110 MCG/ACT inhaler 12 g 11 7/6/2022  No  Sig - Route: Inhale 2 puffs into the lungs 2 times daily - Inhalation  Sent to pharmacy as: Fluticasone Propionate  MCG/ACT Inhalation Aerosol (Flovent HFA)      Controlled Substance Agreement on file:   CSA -- Patient Level:    CSA: None found at the patient level.       Who prescribed the medication?: Dr. Kelley     Do you need a refill? Yes: Patient said she is out    When did you use the medication last? unknown    Patient offered an appointment? No    Do you have any questions or concerns?  Yes: I have asthma and it is allergy season. Can you prescribe as soon as possibl    Preferred Pharmacy:   [id number] [address]  AT [address] [place] [address]  Phone: [phone] Fax: [phone]      Could we send this information to you in [employer] or would you prefer to receive a phone call?: No  Patient would prefer a phone call   Okay to leave a detailed message?: Yes at Cell number on file:    Telephone Information:   Mobile [phone]

## 2022-10-10 NOTE — TELEPHONE ENCOUNTER
"Patient calling, 2ND REQUEST, stating she needs to get her asthma medication Flovent HFA.  \"Patient having a hard time breathing.\"  Allergy season.  [facility] on [address] [facility].    Please ADVISE.    Patient would like a call back at [phone]. A voice message can be left.     Jose Alfredo Hooker       "

## 2022-10-11 ENCOUNTER — TELEPHONE (OUTPATIENT)
Dept: FAMILY MEDICINE | Facility: CLINIC | Age: 52
End: 2022-10-11

## 2022-10-11 RX ORDER — ALBUTEROL SULFATE 90 UG/1
AEROSOL, METERED RESPIRATORY (INHALATION)
Qty: 18 G | Refills: 11 | Status: SHIPPED | OUTPATIENT
Start: 2022-10-11 | End: 2023-07-14

## 2022-10-11 RX ORDER — FLUTICASONE PROPIONATE 110 UG/1
2 AEROSOL, METERED RESPIRATORY (INHALATION) 2 TIMES DAILY
Qty: 12 G | Refills: 11 | Status: SHIPPED | OUTPATIENT
Start: 2022-10-11 | End: 2023-10-26

## 2022-10-11 NOTE — TELEPHONE ENCOUNTER
"Called pharmacy and waited on hold for 20 minutes or more. Per pharmacy staff, apparently patient paid out of pocket for the Flovent inhaler already at 30$. The generic is over 100$.     Next time it is filled, pharmacy just recommends it says \"Fluticasone or generic\" and they can avoid faxing us for the okay to dispense the brand vs generic.   "

## 2022-10-11 NOTE — TELEPHONE ENCOUNTER
Received incoming fax from [facility] on [employer] and it is stating that fluticasone (FLOVENT HFA) 110 MCG/ACT inhaler drug is not covered by patient plan. The preferred alternative is Flovent HFA Aer MCG. Please switch medication to the preferred alternative per patient plan or would you like to have PA started?    Deandra Tsai, CMA

## 2022-10-17 ENCOUNTER — TELEPHONE (OUTPATIENT)
Dept: FAMILY MEDICINE | Facility: CLINIC | Age: 52
End: 2022-10-17

## 2022-10-17 ENCOUNTER — E-VISIT (OUTPATIENT)
Dept: FAMILY MEDICINE | Facility: CLINIC | Age: 52
End: 2022-10-17
Payer: COMMERCIAL

## 2022-10-17 DIAGNOSIS — E78.5 DYSLIPIDEMIA: Primary | ICD-10-CM

## 2022-10-17 PROCEDURE — 99423 OL DIG E/M SVC 21+ MIN: CPT | Performed by: FAMILY MEDICINE

## 2022-10-17 RX ORDER — ATORVASTATIN CALCIUM 10 MG/1
10 TABLET, FILM COATED ORAL DAILY
Qty: 90 TABLET | Refills: 1 | Status: SHIPPED | OUTPATIENT
Start: 2022-10-17 | End: 2023-04-28

## 2022-10-17 NOTE — TELEPHONE ENCOUNTER
"10-17-22  There was a msg in the in-basket under:    It pt sent a msg along the lines of wondering if DR Bhakta was leaving, I called pt back stated dr bhakta is leaving in Dec and a letter will be sent soon if she hasnt already received it, I didn't say were in the process of hiring a few new providers pt declined stated she will just switch her primary care to \"[place] [facility]\" I went to note that information and the msg disappeared somehow , so im just noting this way  chente   "

## 2022-11-03 ENCOUNTER — IMMUNIZATION (OUTPATIENT)
Dept: NURSING | Facility: CLINIC | Age: 52
End: 2022-11-03
Payer: COMMERCIAL

## 2022-11-03 PROCEDURE — 91312 COVID-19,PF,PFIZER BOOSTER BIVALENT: CPT

## 2022-11-03 PROCEDURE — 0124A COVID-19,PF,PFIZER BOOSTER BIVALENT: CPT

## 2022-12-02 ENCOUNTER — HOSPITAL ENCOUNTER (OUTPATIENT)
Dept: MAMMOGRAPHY | Facility: CLINIC | Age: 52
Discharge: HOME | End: 2022-12-02
Attending: FAMILY MEDICINE | Admitting: FAMILY MEDICINE
Payer: COMMERCIAL

## 2022-12-02 DIAGNOSIS — Z12.31 VISIT FOR SCREENING MAMMOGRAM: ICD-10-CM

## 2022-12-02 PROCEDURE — 77067 SCR MAMMO BI INCL CAD: CPT

## 2022-12-06 ENCOUNTER — MYC MEDICAL ADVICE (OUTPATIENT)
Dept: FAMILY MEDICINE | Facility: CLINIC | Age: 52
End: 2022-12-06

## 2022-12-06 DIAGNOSIS — E78.5 DYSLIPIDEMIA: Primary | ICD-10-CM

## 2022-12-06 NOTE — TELEPHONE ENCOUNTER
"Left voice message for patient notifiying of Dr. Bhakta's message.  \"If she is not feeling well, go ahead and cancel the appointment. I am happy to refill any meds that she needs. I will place a fasting cholesterol lab order and she can schedule a lab visit when she gets better. Please wish her well for me, and happy holidays.\"    Message sent via [employer] also.   "

## 2022-12-07 ENCOUNTER — NURSE TRIAGE (OUTPATIENT)
Dept: FAMILY MEDICINE | Facility: CLINIC | Age: 52
End: 2022-12-07

## 2022-12-07 NOTE — TELEPHONE ENCOUNTER
Patient calling with symptoms of cough, mild shortness of breath, pain with cough, symptoms started Saturday over 72 hours, advised Virtual visit with provider, patient transferred to scheduling for virtual visit today.  Patient advised to be seen in ED with any worsening symptoms, patient verbalized understanding.     Reason for Disposition    Breathing difficulty    [1] MODERATE longstanding difficulty breathing (e.g., speaks in phrases, SOB even at rest, pulse 100-120) AND [2] SAME as normal    Additional Information    Negative: SEVERE difficulty breathing (e.g., struggling for each breath, speaks in single words)    Negative: SEVERE difficulty breathing (e.g., struggling for each breath, speaks in single words)    Negative: [1] Breathing stopped AND [2] hasn't returned    Negative: Choking on something    Negative: Bluish (or gray) lips or face now    Negative: Difficult to awaken or acting confused (e.g., disoriented, slurred speech)    Negative: Passed out (i.e., lost consciousness, collapsed and was not responding)    Negative: Wheezing started suddenly after medicine, an allergic food or bee sting    Negative: Stridor    Negative: Slow, shallow and weak breathing    Negative: Sounds like a life-threatening emergency to the triager    Negative: Chest pain    Negative: [1] Wheezing (high pitched whistling sound) AND [2] previous asthma attacks or use of asthma medicines    Negative: [1] Difficulty breathing AND [2] only present when coughing    Negative: [1] Difficulty breathing AND [2] only from stuffy or runny nose    Negative: [1] Difficulty breathing AND [2] within 14 days of COVID-19 Exposure    Negative: [1] MODERATE difficulty breathing (e.g., speaks in phrases, SOB even at rest, pulse 100-120) AND [2] NEW-onset or WORSE than normal    Negative: Oxygen level (e.g., pulse oximetry) 90 percent or lower    Negative: Wheezing can be heard across the room    Negative: Drooling or spitting out saliva  "(because can't swallow)    Negative: History of prior \"blood clot\" in leg or lungs (i.e., deep vein thrombosis, pulmonary embolism)    Negative: History of inherited increased risk of blood clots (e.g., Factor 5 Leiden, Anti-thrombin 3, Protein C or Protein S deficiency, Prothrombin mutation)    Negative: Major surgery in the past month    Negative: Hip or leg fracture (broken bone) in past month (or had cast on leg or ankle in past month)    Negative: Illness requiring prolonged bedrest in past month (e.g., immobilization, long hospital stay)    Negative: Long-distance travel in past month (e.g., car, bus, train, plane; with trip lasting 6 or more hours)    Negative: Cancer treatment in past six months (or has cancer now)    Negative: Extra heart beats OR irregular heart beating  (i.e., \"palpitations\")    Negative: Fever > 103 F (39.4 C)    Negative: [1] Fever > 101 F (38.3 C) AND [2] age > 60 years    Negative: [1] Fever > 100.0 F (37.8 C) AND [2] bedridden (e.g., nursing home patient, CVA, chronic illness, recovering from surgery)    Negative: [1] Fever > 100.0 F (37.8 C) AND [2] diabetes mellitus or weak immune system (e.g., HIV positive, cancer chemo, splenectomy, organ transplant, chronic steroids)    Negative: [1] Periods where breathing stops and then resumes normally AND [2] bedridden (e.g., nursing home patient, CVA)    Negative: Pregnant or postpartum (from 0 to 6 weeks after delivery)    Negative: Patient sounds very sick or weak to the triager    Negative: [1] MILD difficulty breathing (e.g., minimal/no SOB at rest, SOB with walking, pulse <100) AND [2] NEW-onset or WORSE than normal    Negative: [1] Longstanding difficulty breathing (e.g., CHF, COPD, emphysema) AND [2] WORSE than normal    Negative: [1] Longstanding difficulty breathing AND [2] not responding to usual therapy    Negative: Continuous (nonstop) coughing interferes with work, school, or sleeping    Negative: Oxygen level (e.g., pulse " oximetry) 91 to 94 percent    Protocols used: RESPIRATORY MULTIPLE SYMPTOMS - GUIDELINE [id number], BREATHING DIFFICULTY-A-AH

## 2022-12-11 ENCOUNTER — E-VISIT (OUTPATIENT)
Dept: FAMILY MEDICINE | Facility: CLINIC | Age: 52
End: 2022-12-11
Payer: COMMERCIAL

## 2022-12-11 DIAGNOSIS — J45.901 EXACERBATION OF ASTHMA, UNSPECIFIED ASTHMA SEVERITY, UNSPECIFIED WHETHER PERSISTENT: Primary | ICD-10-CM

## 2022-12-11 PROCEDURE — 99423 OL DIG E/M SVC 21+ MIN: CPT | Performed by: FAMILY MEDICINE

## 2022-12-12 RX ORDER — METHYLPREDNISOLONE 4 MG
TABLET, DOSE PACK ORAL
Qty: 21 TABLET | Refills: 0 | Status: SHIPPED | OUTPATIENT
Start: 2022-12-12

## 2022-12-12 NOTE — PATIENT INSTRUCTIONS
Thank you for choosing us for your care. I have placed an order for a prescription so that you can start treatment. View your full visit summary for details by clicking on the link below. Your pharmacist will able to address any questions you may have about the medication.     If you're not feeling better within 5-7 days, please schedule an appointment.  You can schedule an appointment right here in [facility], or call [phone]  If the visit is for the same symptoms as your eVisit, we'll refund the cost of your eVisit if seen within seven days.

## 2022-12-14 ENCOUNTER — ANCILLARY PROCEDURE (OUTPATIENT)
Dept: GENERAL RADIOLOGY | Facility: CLINIC | Age: 52
End: 2022-12-14
Attending: PHYSICIAN ASSISTANT
Payer: COMMERCIAL

## 2022-12-14 ENCOUNTER — OFFICE VISIT (OUTPATIENT)
Dept: FAMILY MEDICINE | Facility: CLINIC | Age: 52
End: 2022-12-14
Payer: COMMERCIAL

## 2022-12-14 VITALS
DIASTOLIC BLOOD PRESSURE: 88 MMHG | SYSTOLIC BLOOD PRESSURE: 136 MMHG | WEIGHT: 169 LBS | TEMPERATURE: 98.1 F | HEART RATE: 74 BPM | BODY MASS INDEX: 27.91 KG/M2 | RESPIRATION RATE: 16 BRPM | OXYGEN SATURATION: 95 %

## 2022-12-14 DIAGNOSIS — R06.02 SHORTNESS OF BREATH: ICD-10-CM

## 2022-12-14 DIAGNOSIS — R06.02 SHORTNESS OF BREATH: Primary | ICD-10-CM

## 2022-12-14 LAB
FLUAV AG SPEC QL IA: NEGATIVE
FLUBV AG SPEC QL IA: NEGATIVE
SARS-COV-2 RNA RESP QL NAA+PROBE: NEGATIVE

## 2022-12-14 PROCEDURE — U0003 INFECTIOUS AGENT DETECTION BY NUCLEIC ACID (DNA OR RNA); SEVERE ACUTE RESPIRATORY SYNDROME CORONAVIRUS 2 (SARS-COV-2) (CORONAVIRUS DISEASE [COVID-19]), AMPLIFIED PROBE TECHNIQUE, MAKING USE OF HIGH THROUGHPUT TECHNOLOGIES AS DESCRIBED BY CMS-2020-01-R: HCPCS | Performed by: PHYSICIAN ASSISTANT

## 2022-12-14 PROCEDURE — 99215 OFFICE O/P EST HI 40 MIN: CPT | Mod: CS | Performed by: PHYSICIAN ASSISTANT

## 2022-12-14 PROCEDURE — 87804 INFLUENZA ASSAY W/OPTIC: CPT | Performed by: PHYSICIAN ASSISTANT

## 2022-12-14 PROCEDURE — U0005 INFEC AGEN DETEC AMPLI PROBE: HCPCS | Performed by: PHYSICIAN ASSISTANT

## 2022-12-14 PROCEDURE — 71046 X-RAY EXAM CHEST 2 VIEWS: CPT | Mod: TC | Performed by: RADIOLOGY

## 2022-12-14 NOTE — PROGRESS NOTES
"  Assessment & Plan:      Problem List Items Addressed This Visit    None  Visit Diagnoses     Shortness of breath    -  Primary    Relevant Orders    XR Chest 2 Views    Symptomatic COVID-19 Virus (Coronavirus) by PCR Nose    Influenza A & B Antigen - Clinic Collect (Completed)        Medical Decision Making  Patient with history of asthma presents with worsening shortness of breath after 2 weeks of cough and rhinorrhea.  Physical exam today does appear reassuring with no signs of obvious respiratory distress.  Patient also has clear lung auscultation.  Chest x-ray shows no definitive signs of focal pneumonia per my interpretation.  Still waiting for radiology report.  See no obvious signs of asthma exacerbation and patient does not seem to be noticing good improvement with the inhalers or the prednisone.  Recommend discontinuing the prednisone, as I feel that this medication could be causing increased anxiety and the patient.  She may continue with her inhalers as needed.  See no red flag symptoms for pulmonary embolism given normal heart rate and normal oxygen saturation on room air.  Discussed treatment and symptomatic care.  Allergies and medication interactions reviewed.  Discussed signs of worsening symptoms and when to follow-up with PCP if no symptom improvement.    40 minutes spent in total for chart review, patient examination, and discussion with patient on labs, counseling,and coordination of care as listed above.     Subjective:      History provided by the patient.  She is also here with her .  Martine Biswas is a 51 year old female with history of asthma, here for evaluation of shortness of breath.  Onset of symptoms was 2 weeks ago.  Patient initially developed a \"cold-like illness\".  Those symptoms seem to have improved until developing new sensation of shortness of breath, shallow breathing, and pain when taking a deep breath over the last 1 to 2 days.  Patient did an E-visit yesterday " "and was prescribed a Medrol Dosepak.  She has taken 2 doses and has noticed no relief.  She is also been using her albuterol inhaler and her inhaled steroids with no relief.  Patient states that she had a fall on \"panic attack\" this morning.     The following portions of the patient's history were reviewed and updated as appropriate: allergies, current medications, and problem list.     Review of Systems  Pertinent items are noted in HPI.    Allergies  Allergies   Allergen Reactions     Amoxicillin Diarrhea       Family History   Problem Relation Age of Onset     Clotting Disorder No family hx of      Anesthesia Reaction No family hx of        Social History     Tobacco Use     Smoking status: Never     Smokeless tobacco: Never   Substance Use Topics     Alcohol use: Yes     Alcohol/week: 7.0 - 14.0 standard drinks        Objective:      /88   Pulse 74   Temp 98.1  F (36.7  C) (Oral)   Resp 16   Wt 76.7 kg (169 lb)   SpO2 95%   BMI 27.91 kg/m    General appearance - alert, well appearing, tearful, and in no distress and non-toxic  Ears -TMs intact mild to moderate mucoid fluid and bulging bilaterally, no erythema  Nose - normal and patent, no erythema, discharge or polyps  Mouth - mucous membranes moist, pharynx normal without lesions  Neck - supple, no significant adenopathy  Chest - clear to auscultation, no wheezes, rales or rhonchi, symmetric air entry  Heart - normal rate, regular rhythm, normal S1, S2, no murmurs, rubs, clicks or gallops     Lab & Imaging Results    Results for orders placed or performed in visit on 12/14/22   Influenza A & B Antigen - Clinic Collect     Status: Normal    Specimen: Nose; Swab   Result Value Ref Range    Influenza A antigen Negative Negative    Influenza B antigen Negative Negative    Narrative    Test results must be correlated with clinical data. If necessary, results should be confirmed by a molecular assay or viral culture.       I personally reviewed these " results and discussed findings with the patient.    The use of Dragon/[employer] dictation services was used to construct the content of this note; any grammatical errors are non-intentional. Please contact the author directly if you are in need of any clarification.

## 2022-12-29 ENCOUNTER — TRANSFERRED RECORDS (OUTPATIENT)
Dept: HEALTH INFORMATION MANAGEMENT | Facility: CLINIC | Age: 52
End: 2022-12-29
Payer: COMMERCIAL

## 2023-04-20 ENCOUNTER — PATIENT OUTREACH (OUTPATIENT)
Dept: CARE COORDINATION | Facility: CLINIC | Age: 53
End: 2023-04-20
Payer: COMMERCIAL

## 2023-07-23 ENCOUNTER — HEALTH MAINTENANCE LETTER (OUTPATIENT)
Age: 53
End: 2023-07-23

## 2023-09-12 ENCOUNTER — TELEPHONE (OUTPATIENT)
Dept: FAMILY MEDICINE | Facility: CLINIC | Age: 53
End: 2023-09-12
Payer: COMMERCIAL

## 2023-09-12 NOTE — TELEPHONE ENCOUNTER
Fax sent    [facility]    Confirmed on RightFax    Date: 09/12/2023  Time: 9:04 AM    Fax: [phone]    Raghavendra JEFFERSON

## 2023-09-13 ENCOUNTER — IMMUNIZATION (OUTPATIENT)
Dept: FAMILY MEDICINE | Facility: CLINIC | Age: 53
End: 2023-09-13
Payer: COMMERCIAL

## 2023-09-13 PROCEDURE — 90471 IMMUNIZATION ADMIN: CPT

## 2023-09-13 PROCEDURE — 90682 RIV4 VACC RECOMBINANT DNA IM: CPT

## 2023-10-26 DIAGNOSIS — J45.30 MILD PERSISTENT ASTHMA WITHOUT COMPLICATION: ICD-10-CM

## 2023-10-26 RX ORDER — FLUTICASONE PROPIONATE 110 UG/1
2 AEROSOL, METERED RESPIRATORY (INHALATION) 2 TIMES DAILY
Qty: 12 G | Refills: 3 | Status: SHIPPED | OUTPATIENT
Start: 2023-10-26

## 2023-11-02 ENCOUNTER — PATIENT OUTREACH (OUTPATIENT)
Dept: CARE COORDINATION | Facility: CLINIC | Age: 53
End: 2023-11-02
Payer: COMMERCIAL

## 2023-11-30 ENCOUNTER — PATIENT OUTREACH (OUTPATIENT)
Dept: CARE COORDINATION | Facility: CLINIC | Age: 53
End: 2023-11-30
Payer: COMMERCIAL

## 2023-12-28 ENCOUNTER — IMMUNIZATION (OUTPATIENT)
Dept: NURSING | Facility: CLINIC | Age: 53
End: 2023-12-28
Payer: COMMERCIAL

## 2023-12-28 PROCEDURE — 91320 SARSCV2 VAC 30MCG TRS-SUC IM: CPT

## 2023-12-28 PROCEDURE — 90480 ADMN SARSCOV2 VAC 1/ONLY CMP: CPT

## 2024-06-25 ENCOUNTER — HOSPITAL ENCOUNTER (OUTPATIENT)
Dept: MAMMOGRAPHY | Facility: CLINIC | Age: 54
Discharge: HOME | End: 2024-06-25
Attending: NURSE PRACTITIONER
Payer: COMMERCIAL

## 2024-06-25 DIAGNOSIS — N64.59 BREAST THICKENING: ICD-10-CM

## 2024-06-25 PROCEDURE — 77062 BREAST TOMOSYNTHESIS BI: CPT

## 2024-06-25 PROCEDURE — 76642 ULTRASOUND BREAST LIMITED: CPT | Mod: LT

## 2024-09-15 ENCOUNTER — HEALTH MAINTENANCE LETTER (OUTPATIENT)
Age: 54
End: 2024-09-15